# Patient Record
Sex: FEMALE | Race: WHITE | NOT HISPANIC OR LATINO | Employment: UNEMPLOYED | URBAN - METROPOLITAN AREA
[De-identification: names, ages, dates, MRNs, and addresses within clinical notes are randomized per-mention and may not be internally consistent; named-entity substitution may affect disease eponyms.]

---
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
## 2024-08-09 ENCOUNTER — OFFICE VISIT (OUTPATIENT)
Dept: ORTHOPEDIC SURGERY | Facility: CLINIC | Age: 19
End: 2024-08-09
Payer: COMMERCIAL

## 2024-08-09 ENCOUNTER — HOSPITAL ENCOUNTER (OUTPATIENT)
Dept: RADIOLOGY | Facility: CLINIC | Age: 19
Discharge: HOME | End: 2024-08-09
Payer: COMMERCIAL

## 2024-08-09 VITALS — HEIGHT: 62 IN | BODY MASS INDEX: 23.85 KG/M2 | WEIGHT: 129.63 LBS

## 2024-08-09 DIAGNOSIS — M41.9 SCOLIOSIS, UNSPECIFIED SCOLIOSIS TYPE, UNSPECIFIED SPINAL REGION: ICD-10-CM

## 2024-08-09 PROCEDURE — 99214 OFFICE O/P EST MOD 30 MIN: CPT | Performed by: ORTHOPAEDIC SURGERY

## 2024-08-09 PROCEDURE — 72081 X-RAY EXAM ENTIRE SPI 1 VW: CPT

## 2024-08-09 PROCEDURE — 3008F BODY MASS INDEX DOCD: CPT | Performed by: ORTHOPAEDIC SURGERY

## 2024-08-09 ASSESSMENT — PAIN SCALES - GENERAL: PAINLEVEL: 4

## 2024-08-09 NOTE — PROGRESS NOTES
Mariana Jensen is a 19 y.o. female who presents today for follow-up of her adolescent idiopathic scoliosis. She has a positive family history of scoliosis; her younger sister and her father have scoliosis. Her paternal great aunt underwent a posterior spinal fusion. She denies pain, neurologic symptoms, and nocturia. She began her menses about 4 years ago. 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     Past Medical History:   Diagnosis Date    Acute upper respiratory infection, unspecified 04/18/2016    Viral upper respiratory illness    Anxiety disorder, unspecified 06/13/2022    Anxiety    Encounter for immunization 12/11/2014    Need for hepatitis A vaccination    Encounter for screening for lipoid disorders 06/10/2013    Encounter for screening for lipoid disorders    Personal history of other diseases of the digestive system 12/06/2018    History of gastroenteritis    Personal history of other diseases of the respiratory system 12/06/2018    History of acute pharyngitis    Personal history of other diseases of the respiratory system 10/29/2015    History of acute pharyngitis    Personal history of other diseases of the respiratory system 05/21/2015    History of acute sinusitis    Personal history of other infectious and parasitic diseases 05/07/2015    History of tinea corporis    Personal history of other infectious and parasitic diseases     History of respiratory syncytial virus infection    Personal history of other specified conditions 10/16/2012    History of headache    Personal history of other specified conditions 02/18/2014    History of vomiting    Personal history of other specified conditions 07/27/2022    History of fatigue    Unspecified asthma with (acute) exacerbation ([facility]) 10/07/2013    Asthma with acute exacerbation    Unspecified asthma with (acute) exacerbation ([facility]) 12/06/2018    Asthma with acute exacerbation    Unspecified injury of  unspecified wrist, hand and finger(s), initial encounter 12/06/2018    Finger injury       No past surgical history on file.    Current Outpatient Medications on File Prior to Visit   Medication Sig Dispense Refill    albuterol 90 mcg/actuation inhaler TAKE 2 PUFFS BY MOUTH EVERY 4 TO 6 HOURS AS NEEDED 18 g 2    ketoconazole (NIZOral) 2 % shampoo APPLY TOPICALLY TO SCALP IN SHOWER 3-4 TIMES PER WEEK, LET SIT FOR THREE TO FIVE MINUTES THEN RINSE      sertraline (Zoloft) 50 mg tablet TAKE 1 AND 1/2 TABLETS BY MOUTH DAILY 135 tablet 2    Sronyx 0.1-20 mg-mcg tablet TAKE 1 TABLET BY MOUTH EVERY DAY 84 tablet 3    tretinoin (Retin-A) 0.025 % cream APPLY PEA SIZE AMOUNT ON FACE 2-3 NIGHTS PER WEEK, INCREASING TO NIGHTLY AS TOLERATED      triamcinolone (Kenalog) 0.1 % cream APPLY TWICE DAILY TO AFFECTED AREA ON LEGS X14 DAYS, THEN AS NEED FOR FLARES UP TO 2 WEEKS       No current facility-administered medications on file prior to visit.       Allergies   Allergen Reactions    Penicillins Diarrhea and Rash       No family history on file.    Social History     Socioeconomic History    Marital status: Single     Spouse name: Not on file    Number of children: Not on file    Years of education: Not on file    Highest education level: Not on file   Occupational History    Not on file   Tobacco Use    Smoking status: Not on file    Smokeless tobacco: Not on file   Substance and Sexual Activity    Alcohol use: Not on file    Drug use: Not on file    Sexual activity: Not on file   Other Topics Concern    Not on file   Social History Narrative    Not on file     Social Determinants of Health     Financial Resource Strain: Not on file   Food Insecurity: Not on file   Transportation Needs: Not on file   Physical Activity: Not on file   Stress: Not on file   Social Connections: Not on file   Intimate Partner Violence: Not on file   Housing Stability: Not on file       ROS:  A 16 system review is negative in all systems except those  "listed above in the history, as reviewed by me.        Physical Exam:    General :  Well developed, well nourished female in no acute distress.  Skin:  The skin is intact with no evidence of abrasions, bruises, or swelling.  Height:   Ht Readings from Last 1 Encounters:   08/09/24 1.573 m (5' 1.93\")     Weight:   Vitals:    08/09/24 1523   Weight: 58.8 kg (129 lb 10.1 oz)     She stood with level pelvis and shoulders. In the forward bend position, there was a moderate TL rib hump and mild lumbar prominence.  There is good spinal mobility with right and left lateral bending, lateral rotation and lumbar extension. The neurological examination was normal.  Muscle strength was 5/5 in all muscle groups. No sensory deficits were present. Deep tendon reflexes were 2+ and symmetrical with no signs of clonus. Babinski signs were absent.  Her muscles are generally tight in her back and she has good flexibility despite that, but a small waist shift.      XR: X-rays show a 30 degree curve from T3 5 to T11, 45 degree curve from T11-L3 and an 18 degree curve from L3-L5.  She has 13 ribs on each side.  She is Risser 5.    A/P:  19 y.o. female with Idiopathic - Adolescent scoliosis.    Her curve measured 45 degrees today whereas it has measured 40 most recently.  This is not a huge change, but does mean we need to keep watching her.  Will see her again in 1 year with upright AP only scoliosis x-rays.  There conversation about the amount of pain that she has because of her tight muscles.  She is doing a lot to try to manage that but the pain remains somewhat persistent.  We talked about both fusion and nonfusion options to surgically improve her back, prevent further progression, and hopefully improve the pain.  Will discuss this again next year although she would be unlikely to pursue any before she graduates college.      "

## 2024-09-01 DIAGNOSIS — F41.9 ANXIETY: ICD-10-CM

## 2024-09-03 RX ORDER — SERTRALINE HYDROCHLORIDE 50 MG/1
TABLET, FILM COATED ORAL
Qty: 135 TABLET | Refills: 2 | Status: SHIPPED | OUTPATIENT
Start: 2024-09-03

## 2024-09-23 DIAGNOSIS — N94.6 DYSMENORRHEA: ICD-10-CM

## 2024-09-23 RX ORDER — LEVONORGESTREL AND ETHINYL ESTRADIOL 0.1-0.02MG
1 KIT ORAL DAILY
Qty: 84 TABLET | Refills: 3 | Status: SHIPPED | OUTPATIENT
Start: 2024-09-23

## 2024-09-23 NOTE — TELEPHONE ENCOUNTER
Patient called in she missed placed her birth control on her way back to school and she was wondering if you could send in some more.

## 2024-12-14 ENCOUNTER — OFFICE VISIT (OUTPATIENT)
Dept: URGENT CARE | Age: 19
End: 2024-12-14
Payer: COMMERCIAL

## 2024-12-14 VITALS
OXYGEN SATURATION: 98 % | DIASTOLIC BLOOD PRESSURE: 77 MMHG | TEMPERATURE: 98.3 F | SYSTOLIC BLOOD PRESSURE: 113 MMHG | HEART RATE: 100 BPM

## 2024-12-14 DIAGNOSIS — J06.9 VIRAL URI: Primary | ICD-10-CM

## 2024-12-14 DIAGNOSIS — Z20.822 SUSPECTED COVID-19 VIRUS INFECTION: ICD-10-CM

## 2024-12-14 DIAGNOSIS — R09.81 SINUS CONGESTION: ICD-10-CM

## 2024-12-14 LAB
POC RAPID INFLUENZA A: NEGATIVE
POC RAPID INFLUENZA B: NEGATIVE
POC SARS-COV-2 AG BINAX: NORMAL

## 2024-12-14 ASSESSMENT — ENCOUNTER SYMPTOMS
SORE THROAT: 0
CARDIOVASCULAR NEGATIVE: 1
CONSTITUTIONAL NEGATIVE: 1
COUGH: 1

## 2024-12-14 NOTE — PROGRESS NOTES
Subjective   Patient ID: Mariana Jensen is a 19 y.o. female. They present today with a chief complaint of Nasal Congestion (Cough, joint pian, yellow mucous, no taste).    History of Present Illness  19-year-old female presents to clinic today with complaints of nasal congestion.  Patient states she is also had some bodyaches.  She states is been ongoing for the last 2 to 3 days.  She has lost her taste.  Patient does induce a cough.  Patient denies chest pain or shortness of breath.  She is taking 1 night of NyQuil.  Denies sore throat.  Does not do some minor ear pain.          Past Medical History  Allergies as of 12/14/2024 - Reviewed 08/09/2024   Allergen Reaction Noted    Penicillins Diarrhea and Rash 07/25/2011       (Not in a hospital admission)       Past Medical History:   Diagnosis Date    Acute upper respiratory infection, unspecified 04/18/2016    Viral upper respiratory illness    Anxiety disorder, unspecified 06/13/2022    Anxiety    Encounter for immunization 12/11/2014    Need for hepatitis A vaccination    Encounter for screening for lipoid disorders 06/10/2013    Encounter for screening for lipoid disorders    Personal history of other diseases of the digestive system 12/06/2018    History of gastroenteritis    Personal history of other diseases of the respiratory system 12/06/2018    History of acute pharyngitis    Personal history of other diseases of the respiratory system 10/29/2015    History of acute pharyngitis    Personal history of other diseases of the respiratory system 05/21/2015    History of acute sinusitis    Personal history of other infectious and parasitic diseases 05/07/2015    History of tinea corporis    Personal history of other infectious and parasitic diseases     History of respiratory syncytial virus infection    Personal history of other specified conditions 10/16/2012    History of headache    Personal history of other specified conditions 02/18/2014    History of  vomiting    Personal history of other specified conditions 07/27/2022    History of fatigue    Unspecified asthma with (acute) exacerbation ([facility]) 10/07/2013    Asthma with acute exacerbation    Unspecified asthma with (acute) exacerbation ([facility]) 12/06/2018    Asthma with acute exacerbation    Unspecified injury of unspecified wrist, hand and finger(s), initial encounter 12/06/2018    Finger injury       No past surgical history on file.         Review of Systems  Review of Systems   Constitutional: Negative.    HENT:  Positive for congestion and ear pain. Negative for sore throat.    Respiratory:  Positive for cough.    Cardiovascular: Negative.                                   Objective    Vitals:    12/14/24 1221   BP: 113/77   Pulse: 100   Temp: 36.8 °C (98.3 °F)   SpO2: 98%     No LMP recorded.    Physical Exam  Constitutional:       General: She is not in acute distress.     Appearance: Normal appearance.   HENT:      Right Ear: Tympanic membrane and ear canal normal.      Left Ear: Tympanic membrane and ear canal normal.      Mouth/Throat:      Mouth: Mucous membranes are moist.      Pharynx: No oropharyngeal exudate or posterior oropharyngeal erythema.   Cardiovascular:      Rate and Rhythm: Normal rate and regular rhythm.      Heart sounds: Normal heart sounds.   Pulmonary:      Effort: Pulmonary effort is normal.      Breath sounds: Normal breath sounds.   Neurological:      Mental Status: She is alert.         Procedures    Point of Care Test & Imaging Results from this visit  Results for orders placed or performed in visit on 12/14/24   POCT Influenza A/B manually resulted   Result Value Ref Range    POC Rapid Influenza A Negative Negative    POC Rapid Influenza B Negative Negative   POCT Covid-19 Rapid Antigen   Result Value Ref Range    POC BENJY-COV-2 AG  Presumptive negative test for SARS-CoV-2 (no antigen detected)     Presumptive negative test for SARS-CoV-2 (no antigen detected)      No  results found.    Diagnostic study results (if any) were reviewed by Yoni Levy PA-C.    Assessment/Plan   Allergies, medications, history, and pertinent labs/EKGs/Imaging reviewed by Yoni Levy PA-C.     Medical Decision Making  Patient pleasant and cooperative on examination.    Cardiopulmonary examination within normal limits.    No other acute abnormality noted.    Rapid COVID and flu testing negative.    I did discuss with patient that this is most likely viral in nature.  Advised on proper over-the-counter medications to supplement with.    Patient alert and oriented, no acute distress upon discharge.    Orders and Diagnoses  Diagnoses and all orders for this visit:  Viral URI  Suspected COVID-19 virus infection  -     POCT Covid-19 Rapid Antigen  Sinus congestion  -     POCT Influenza A/B manually resulted      Medical Admin Record      Patient disposition: Home    Electronically signed by Yoni Levy PA-C  12:37 PM

## 2024-12-14 NOTE — PATIENT INSTRUCTIONS
Symptoms from viral illnesses generally resolve in 7-10 days. Cough may continue for up to 21 days.      Viral illnesses can not be treated with antibiotics. Antibiotics do not work for viruses.      Consider taking Mucinex for congestion. Make sure to drink plenty of fluids while taking this medication as it increases its effectiveness.     Consider Zyrtec or Claritin    Consider Flonase Nasal Spray    Consider taking Sudafed to help decrease any congestion. If no history of high blood pressure.  Consider Corcedin BP for high blood pressure.    Use throat lozenges, buckwheat honey, gargling salt water to help relieve sore throat symptoms.     Recommend inhaling steam from a hot shower or hot bath as well as using saline sinus rinse for congestion. Recommend Sergio Med sinus rinse. Make sure to use bottled or distilled water.

## 2024-12-30 ENCOUNTER — OFFICE VISIT (OUTPATIENT)
Dept: ORTHOPEDIC SURGERY | Facility: CLINIC | Age: 19
End: 2024-12-30
Payer: COMMERCIAL

## 2024-12-30 VITALS — WEIGHT: 133 LBS | BODY MASS INDEX: 24.48 KG/M2 | HEIGHT: 62 IN

## 2024-12-30 DIAGNOSIS — M41.124 ADOLESCENT IDIOPATHIC SCOLIOSIS OF THORACIC REGION: ICD-10-CM

## 2024-12-30 DIAGNOSIS — M41.129 ADOLESCENT IDIOPATHIC SCOLIOSIS: Primary | ICD-10-CM

## 2024-12-30 DIAGNOSIS — M41.125 ADOLESCENT IDIOPATHIC SCOLIOSIS OF THORACOLUMBAR REGION: ICD-10-CM

## 2024-12-30 PROCEDURE — 99214 OFFICE O/P EST MOD 30 MIN: CPT | Performed by: ORTHOPAEDIC SURGERY

## 2024-12-30 PROCEDURE — 3008F BODY MASS INDEX DOCD: CPT | Performed by: ORTHOPAEDIC SURGERY

## 2024-12-30 ASSESSMENT — PAIN - FUNCTIONAL ASSESSMENT: PAIN_FUNCTIONAL_ASSESSMENT: NO/DENIES PAIN

## 2025-01-02 NOTE — PROGRESS NOTES
Mariana Jensen is a 19 y.o. female who presents today for follow-up of her adolescent idiopathic scoliosis. She has a positive family history of scoliosis; her younger sister and her father have scoliosis. Her paternal great aunt underwent a posterior spinal fusion. She denies pain, neurologic symptoms, and nocturia. She began her menses about 4 years ago. 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     Past Medical History:   Diagnosis Date    Acute upper respiratory infection, unspecified 04/18/2016    Viral upper respiratory illness    Anxiety disorder, unspecified 06/13/2022    Anxiety    Encounter for immunization 12/11/2014    Need for hepatitis A vaccination    Encounter for screening for lipoid disorders 06/10/2013    Encounter for screening for lipoid disorders    Personal history of other diseases of the digestive system 12/06/2018    History of gastroenteritis    Personal history of other diseases of the respiratory system 12/06/2018    History of acute pharyngitis    Personal history of other diseases of the respiratory system 10/29/2015    History of acute pharyngitis    Personal history of other diseases of the respiratory system 05/21/2015    History of acute sinusitis    Personal history of other infectious and parasitic diseases 05/07/2015    History of tinea corporis    Personal history of other infectious and parasitic diseases     History of respiratory syncytial virus infection    Personal history of other specified conditions 10/16/2012    History of headache    Personal history of other specified conditions 02/18/2014    History of vomiting    Personal history of other specified conditions 07/27/2022    History of fatigue    Unspecified asthma with (acute) exacerbation ([facility]) 10/07/2013    Asthma with acute exacerbation    Unspecified asthma with (acute) exacerbation ([facility]) 12/06/2018    Asthma with acute exacerbation    Unspecified injury of  unspecified wrist, hand and finger(s), initial encounter 12/06/2018    Finger injury       No past surgical history on file.    Current Outpatient Medications on File Prior to Visit   Medication Sig Dispense Refill    albuterol 90 mcg/actuation inhaler TAKE 2 PUFFS BY MOUTH EVERY 4 TO 6 HOURS AS NEEDED 18 g 2    ketoconazole (NIZOral) 2 % shampoo APPLY TOPICALLY TO SCALP IN SHOWER 3-4 TIMES PER WEEK, LET SIT FOR THREE TO FIVE MINUTES THEN RINSE      sertraline (Zoloft) 50 mg tablet TAKE 1 AND 1/2 TABLETS DAILY BY MOUTH 135 tablet 2    Sronyx 0.1-20 mg-mcg tablet Take 1 tablet by mouth once daily. 84 tablet 3    tretinoin (Retin-A) 0.025 % cream APPLY PEA SIZE AMOUNT ON FACE 2-3 NIGHTS PER WEEK, INCREASING TO NIGHTLY AS TOLERATED      triamcinolone (Kenalog) 0.1 % cream APPLY TWICE DAILY TO AFFECTED AREA ON LEGS X14 DAYS, THEN AS NEED FOR FLARES UP TO 2 WEEKS       No current facility-administered medications on file prior to visit.       Allergies   Allergen Reactions    Penicillins Diarrhea and Rash       No family history on file.    Social History     Socioeconomic History    Marital status: Single     Spouse name: Not on file    Number of children: Not on file    Years of education: Not on file    Highest education level: Not on file   Occupational History    Not on file   Tobacco Use    Smoking status: Not on file    Smokeless tobacco: Not on file   Substance and Sexual Activity    Alcohol use: Not on file    Drug use: Not on file    Sexual activity: Not on file   Other Topics Concern    Not on file   Social History Narrative    Not on file     Social Drivers of Health     Financial Resource Strain: Not on file   Food Insecurity: Not on file   Transportation Needs: Not on file   Physical Activity: Not on file   Stress: Not on file   Social Connections: Not on file   Intimate Partner Violence: Not on file   Housing Stability: Not on file       ROS:  A 16 system review is negative in all systems except those  "listed above in the history, as reviewed by me.        Physical Exam:    General :  Well developed, well nourished female in no acute distress.  Skin:  The skin is intact with no evidence of abrasions, bruises, or swelling.  Height:   Ht Readings from Last 1 Encounters:   12/30/24 1.575 m (5' 2\")     Weight:   Vitals:    12/30/24 1429   Weight: 60.3 kg (133 lb)     She stood with level pelvis and shoulders. In the forward bend position, there was a moderate TL rib hump and mild lumbar prominence.  There is good spinal mobility with right and left lateral bending, lateral rotation and lumbar extension. The neurological examination was normal.  Muscle strength was 5/5 in all muscle groups. No sensory deficits were present. Deep tendon reflexes were 2+ and symmetrical with no signs of clonus. Babinski signs were absent.  Her muscles are generally tight in her back and she has good flexibility despite that, but a small waist shift.      XR: X-rays show a 30 degree curve from T3 5 to T11, 47 degree curve from T11-L3 and an 18 degree curve from L3-L5.  She has 13 ribs on each side.  She is Risser 5.    A/P:  19 y.o. female with Idiopathic - Adolescent scoliosis.    Her curve measured 47 degrees today.  Her steady progression has continued and she is now uncomfortable enough to elect for proceeding with surgery.    I discussed with Mariana Jensen and family that the curve has progressed considerably.  It has reached surgical magnitude.  This is typically reserved for curves that exceed 45 or 50 degrees.  Since the curve has already reached this size, I have recommended a posterior spinal fusion and segmental spinal instrumentation.    I discussed with the family the indications for surgery, the procedure itself, the postoperative management, and the expected results.  I also discussed possible complications, including postoperative neurologic injury, postoperative infection, bone and metal failure, and late " pseudoarthrosis.  We will plan to instrument the thoracic and lumbar spine from approximately T4 to L4.  Standard instrumentation is Orthopediatrics Response.  Allograft is Musculoskeletal transplant [facility] Cancellous Chips.  If we have ordered a preop CT, it is to allow for computerized navigation in the course of the surgery.     We will be in touch with the family to schedule the surgery.

## 2025-01-31 DIAGNOSIS — M41.9 SCOLIOSIS, UNSPECIFIED SCOLIOSIS TYPE, UNSPECIFIED SPINAL REGION: Primary | ICD-10-CM

## 2025-04-07 DIAGNOSIS — M41.125 ADOLESCENT IDIOPATHIC SCOLIOSIS OF THORACOLUMBAR REGION: Primary | ICD-10-CM

## 2025-04-28 ENCOUNTER — CLINICAL SUPPORT (OUTPATIENT)
Dept: PREADMISSION TESTING | Facility: HOSPITAL | Age: 20
End: 2025-04-28
Payer: COMMERCIAL

## 2025-04-28 NOTE — CPM/PAT H&P
CPM/PAT Evaluation       Name: Mariana Jensen (Mariana Jensen)  /Age: 2005/ y.o.     {[facility] Visit Type:89908}      Chief Complaint: ***    HPI    Medical History[1]    Surgical History[2]    Patient  has no history on file for sexual activity.    Family History[3]    Allergies[4]    Prior to Admission medications    Medication Sig Start Date End Date Taking? Authorizing Provider   albuterol 90 mcg/actuation inhaler TAKE 2 PUFFS BY MOUTH EVERY 4 TO 6 HOURS AS NEEDED 7/3/23   DELPHINE Lugo   ketoconazole (NIZOral) 2 % shampoo APPLY TOPICALLY TO SCALP IN SHOWER 3-4 TIMES PER WEEK, LET SIT FOR THREE TO FIVE MINUTES THEN RINSE 23   Historical Provider, MD   sertraline (Zoloft) 50 mg tablet TAKE 1 AND 1/2 TABLETS DAILY BY MOUTH 9/3/24   DELPHINE Lugo   Sronyx 0.1-20 mg-mcg tablet Take 1 tablet by mouth once daily. 24   DELPHINE Lugo   tretinoin (Retin-A) 0.025 % cream APPLY PEA SIZE AMOUNT ON FACE 2-3 NIGHTS PER WEEK, INCREASING TO NIGHTLY AS TOLERATED 23   Historical Provider, MD   triamcinolone (Kenalog) 0.1 % cream APPLY TWICE DAILY TO AFFECTED AREA ON LEGS X14 DAYS, THEN AS NEED FOR FLARES UP TO 2 WEEKS 3/15/23   Historical Provider, MD PHI YIP Physical Exam     Airway    Testing/Diagnostic:     Patient Specialist/PCP:   Data only, no medication, providers, or history verified. Information updated based solely on chart review.      PCP  7/3/23 - DELPHINE Lugo  Wellness visit    Ortho Surg  24 - Carmen Jones MD    Follow-up adolescent idiopathic scoliosis. Her curve has progressed and reached surgical magnitude, recommended posterior spinal fusion and segmental spinal instrumentation.     Visit Vitals  Smoking Status Never       DASI Risk Score      Flowsheet Row Questionnaire Series Submission from 1/3/2025 in [facility] with Generic Provider Mycindigot   Can you take care of yourself (eat, dress, bathe, or use  toilet)?  2.75  filed at 01/03/2025 2051   Can you walk indoors, such as around your house? 1.75  filed at 01/03/2025 2051   Can you walk a block or two on level ground?  2.75  filed at 01/03/2025 2051   Can you climb a flight of stairs or walk up a hill? 5.5  filed at 01/03/2025 2051   Can you run a short distance? 8  filed at 01/03/2025 2051   Can you do light work around the house like dusting or washing dishes? 2.7  filed at 01/03/2025 2051   Can you do moderate work around the house like vacuuming, sweeping floors or carrying groceries? 3.5  filed at 01/03/2025 2051   Can you do heavy work around the house like scrubbing floors or lifting and moving heavy furniture?  8  filed at 01/03/2025 2051   Can you do yard work like raking leaves, weeding or pushing a mower? 4.5  filed at 01/03/2025 2051   Can you have sexual relations? 5.25  filed at 01/03/2025 2051   Can you participate in moderate recreational activities like golf, bowling, dancing, doubles tennis or throwing a baseball or football? 6  filed at 01/03/2025 2051   Can you participate in strenous sports like swimming, singles tennis, football, basketball, or skiing? 7.5  filed at 01/03/2025 2051   DASI SCORE 58.2  filed at 01/03/2025 2051   METS Score (Will be calculated only when all the questions are answered) 9.9  filed at 01/03/2025 2051          Caprini DVT Assessment    No data to display       Modified Frailty Index    No data to display       [id number] Stroke Risk Points  Current as of just now        N/A 0 to 9 Points:      Last Change: N/A          The [id number] risk score (Lip SETH, et al. 2009. © 2010 American College [employer]) quantifies the risk of stroke for a patient with atrial fibrillation. For patients without atrial fibrillation or under the age of 18 this score appears as N/A. Higher score values generally indicate higher risk of stroke.        This score is not applicable to this patient. Components are not calculated.           Revised Cardiac Risk Index    No data to display       Apfel Simplified Score    No data to display       Risk Analysis Index Results This Encounter    No data found in the last 10 encounters.       Stop Bang Score      Flowsheet Row Questionnaire Series Submission from 1/3/2025 in [facility] with Generic Provider Marley   Do you snore loudly? 0  filed at 01/03/2025 2051   Do you often feel tired or fatigued after your sleep? 0  filed at 01/03/2025 2051   Has anyone ever observed you stop breathing in your sleep? 0  filed at 01/03/2025 2051   Do you have or are you being treated for high blood pressure? 0  filed at 01/03/2025 2051   Recent BMI (Calculated) 24.3  filed at 01/03/2025 2051   Is BMI greater than 35 kg/m2? 0=No  filed at 01/03/2025 2051   Age older than 50 years old? 0=No  filed at 01/03/2025 2051   Gender - Male 0=No  filed at 01/03/2025 2051          Prodigy: High Risk  Total Score: 0          ARISCAT Score for Postoperative Pulmonary Complications    No data to display       Crawley Perioperative Risk for Myocardial Infarction or Cardiac Arrest (SIM)    No data to display         Assessment and Plan:     {[facility] ASSESSMENT AND PLAN:31385}             [1]   Past Medical History:  Diagnosis Date    Anemia     Ankle sprain     Anxiety disorder, unspecified 06/13/2022    Anxiety    Asthma     Fracture of hand     Fracture of wrist     left    Migraine     Scoliosis     Spider angioma     cheeks and hand    Unspecified asthma with (acute) exacerbation ([facility]) 10/07/2013    Asthma with acute exacerbation    Unspecified asthma with (acute) exacerbation ([facility]) 12/06/2018    Asthma with acute exacerbation    Unspecified injury of unspecified wrist, hand and finger(s), initial encounter 12/06/2018    Finger injury   [2]   Past Surgical History:  Procedure Laterality Date    FRACTURE SURGERY      left wrist    LASER ABLATION OF VASCULAR LESION  01/08/2016    laser of spider angioma on  the cheeks, hands    WRIST SURGERY  03/13/2019    Excision of L wrist dorsal osteophyte   [3]   Family History  Problem Relation Name Age of Onset    Asthma Mother      Allergy (severe) Mother      Migraines Mother      Asthma Father      Migraines Father      Hypertension Father      Allergy (severe) Father      Scoliosis Father      Scoliosis Sister      Hyperlipidemia Maternal Grandmother      Hypertension Maternal Grandmother      Cancer Maternal Grandmother      Other (acute myocardial infarction) Paternal Grandmother      Migraines Paternal Grandfather      Hyperlipidemia Paternal Grandfather      Diabetes Mother's Brother      Rheumatologic disease Father's Brother     [4]   Allergies  Allergen Reactions    Penicillins Diarrhea and Rash

## 2025-05-05 ENCOUNTER — PRE-ADMISSION TESTING (OUTPATIENT)
Dept: PREADMISSION TESTING | Facility: HOSPITAL | Age: 20
End: 2025-05-05
Payer: COMMERCIAL

## 2025-05-05 VITALS
HEART RATE: 82 BPM | TEMPERATURE: 97.6 F | DIASTOLIC BLOOD PRESSURE: 73 MMHG | OXYGEN SATURATION: 96 % | SYSTOLIC BLOOD PRESSURE: 108 MMHG | BODY MASS INDEX: 24.11 KG/M2 | HEIGHT: 62 IN | WEIGHT: 131 LBS

## 2025-05-05 DIAGNOSIS — M41.9 SCOLIOSIS, UNSPECIFIED SCOLIOSIS TYPE, UNSPECIFIED SPINAL REGION: ICD-10-CM

## 2025-05-05 LAB
ABO GROUP (TYPE) IN BLOOD: NORMAL
ANION GAP SERPL CALC-SCNC: 14 MMOL/L (ref 10–20)
ANTIBODY SCREEN: NORMAL
APTT PPP: 30 SECONDS (ref 26–36)
BUN SERPL-MCNC: 10 MG/DL (ref 6–23)
CALCIUM SERPL-MCNC: 9.6 MG/DL (ref 8.6–10.6)
CHLORIDE SERPL-SCNC: 99 MMOL/L (ref 98–107)
CO2 SERPL-SCNC: 30 MMOL/L (ref 21–32)
CREAT SERPL-MCNC: 0.79 MG/DL (ref 0.5–1.05)
EGFRCR SERPLBLD CKD-EPI 2021: >90 ML/MIN/1.73M*2
ERYTHROCYTE [DISTWIDTH] IN BLOOD BY AUTOMATED COUNT: 12.3 % (ref 11.5–14.5)
GLUCOSE SERPL-MCNC: 65 MG/DL (ref 74–99)
HCT VFR BLD AUTO: 48.5 % (ref 36–46)
HGB BLD-MCNC: 16.1 G/DL (ref 12–16)
INR PPP: 1 (ref 0.9–1.1)
MCH RBC QN AUTO: 30.1 PG (ref 26–34)
MCHC RBC AUTO-ENTMCNC: 33.2 G/DL (ref 32–36)
MCV RBC AUTO: 91 FL (ref 80–100)
NRBC BLD-RTO: 0 /100 WBCS (ref 0–0)
PLATELET # BLD AUTO: 300 X10*3/UL (ref 150–450)
POTASSIUM SERPL-SCNC: 4.5 MMOL/L (ref 3.5–5.3)
PROTHROMBIN TIME: 10.5 SECONDS (ref 9.8–12.4)
RBC # BLD AUTO: 5.34 X10*6/UL (ref 4–5.2)
RH FACTOR (ANTIGEN D): NORMAL
SODIUM SERPL-SCNC: 138 MMOL/L (ref 136–145)
WBC # BLD AUTO: 9.7 X10*3/UL (ref 4.4–11.3)

## 2025-05-05 PROCEDURE — 99204 OFFICE O/P NEW MOD 45 MIN: CPT | Performed by: NURSE PRACTITIONER

## 2025-05-05 PROCEDURE — 36415 COLL VENOUS BLD VENIPUNCTURE: CPT

## 2025-05-05 PROCEDURE — 85730 THROMBOPLASTIN TIME PARTIAL: CPT | Performed by: ORTHOPAEDIC SURGERY

## 2025-05-05 PROCEDURE — 85027 COMPLETE CBC AUTOMATED: CPT | Performed by: ORTHOPAEDIC SURGERY

## 2025-05-05 PROCEDURE — 87081 CULTURE SCREEN ONLY: CPT

## 2025-05-05 PROCEDURE — 80048 BASIC METABOLIC PNL TOTAL CA: CPT

## 2025-05-05 PROCEDURE — 86850 RBC ANTIBODY SCREEN: CPT

## 2025-05-05 RX ORDER — CHLORHEXIDINE GLUCONATE 40 MG/ML
SOLUTION TOPICAL
Qty: 473 ML | Refills: 0 | Status: SHIPPED | OUTPATIENT
Start: 2025-05-05

## 2025-05-05 RX ORDER — CHLORHEXIDINE GLUCONATE ORAL RINSE 1.2 MG/ML
SOLUTION DENTAL
Qty: 15 ML | Refills: 0 | Status: SHIPPED | OUTPATIENT
Start: 2025-05-05

## 2025-05-05 ASSESSMENT — DUKE ACTIVITY SCORE INDEX (DASI)
CAN YOU WALK INDOORS, SUCH AS AROUND YOUR HOUSE: YES
CAN YOU TAKE CARE OF YOURSELF (EAT, DRESS, BATHE, OR USE TOILET): YES
CAN YOU WALK A BLOCK OR TWO ON LEVEL GROUND: YES
CAN YOU DO YARD WORK LIKE RAKING LEAVES, WEEDING OR PUSHING A MOWER: YES
CAN YOU PARTICIPATE IN STRENOUS SPORTS LIKE SWIMMING, SINGLES TENNIS, FOOTBALL, BASKETBALL, OR SKIING: YES
CAN YOU PARTICIPATE IN MODERATE RECREATIONAL ACTIVITIES LIKE GOLF, BOWLING, DANCING, DOUBLES TENNIS OR THROWING A BASEBALL OR FOOTBALL: YES
DASI METS SCORE: 9.9
CAN YOU DO LIGHT WORK AROUND THE HOUSE LIKE DUSTING OR WASHING DISHES: YES
TOTAL_SCORE: 58.2
CAN YOU DO HEAVY WORK AROUND THE HOUSE LIKE SCRUBBING FLOORS OR LIFTING AND MOVING HEAVY FURNITURE: YES
CAN YOU DO MODERATE WORK AROUND THE HOUSE LIKE VACUUMING, SWEEPING FLOORS OR CARRYING GROCERIES: YES
CAN YOU HAVE SEXUAL RELATIONS: YES
CAN YOU RUN A SHORT DISTANCE: YES
CAN YOU CLIMB A FLIGHT OF STAIRS OR WALK UP A HILL: YES

## 2025-05-05 ASSESSMENT — ENCOUNTER SYMPTOMS
ENDOCRINE NEGATIVE: 1
ARTHRALGIAS: 1
NECK NEGATIVE: 1
NEUROLOGICAL NEGATIVE: 1
GASTROINTESTINAL NEGATIVE: 1
CARDIOVASCULAR NEGATIVE: 1
RESPIRATORY NEGATIVE: 1
EYES NEGATIVE: 1
CONSTITUTIONAL NEGATIVE: 1
MYALGIAS: 1

## 2025-05-05 ASSESSMENT — LIFESTYLE VARIABLES: SMOKING_STATUS: NONSMOKER

## 2025-05-05 NOTE — CPM/PAT H&P
CPM/PAT Evaluation       Name: Mariana Jensen (Mariana Jensen)  /Age:  y.o.     Visit Type:   In-Person       Chief Complaint: perioperative evaluation      HPI  The patient is a 19 year old female with adolescent idiopathic scoliosis. She denies pain, neurologic symptoms, and nocturia. She is referred today by Dr. Carmen Jones for perioperative evaluation in anticipation of Posterior spinal fusion with instrumentation and bone graft on 25.    Medical History[1]    Surgical History[2]    Patient Sexual activity questions deferred to the physician.    Family History[3]    Allergies[4]    Prior to Admission medications    Medication Sig Start Date End Date Taking? Authorizing Provider   albuterol 90 mcg/actuation inhaler TAKE 2 PUFFS BY MOUTH EVERY 4 TO 6 HOURS AS NEEDED 7/3/23   DELPHINE Lugo   ketoconazole (NIZOral) 2 % shampoo APPLY TOPICALLY TO SCALP IN SHOWER 3-4 TIMES PER WEEK, LET SIT FOR THREE TO FIVE MINUTES THEN RINSE 23   Historical Provider, MD   sertraline (Zoloft) 50 mg tablet TAKE 1 AND 1/2 TABLETS DAILY BY MOUTH 9/3/24   DELPHINE Lugo   Sronyx 0.1-20 mg-mcg tablet Take 1 tablet by mouth once daily. 24   DELPHINE Lugo   tretinoin (Retin-A) 0.025 % cream APPLY PEA SIZE AMOUNT ON FACE 2-3 NIGHTS PER WEEK, INCREASING TO NIGHTLY AS TOLERATED 23   Historical Provider, MD   triamcinolone (Kenalog) 0.1 % cream APPLY TWICE DAILY TO AFFECTED AREA ON LEGS X14 DAYS, THEN AS NEED FOR FLARES UP TO 2 WEEKS 3/15/23   Historical Provider, MD YIP ROS:   Constitutional:   neg    Neuro/Psych:    B/l lower extremity neuropathy  neg    Eyes:   neg    Ears:   neg    Nose:   neg    Mouth:   neg    Throat:   neg    Neck:   neg    Cardio:   neg    Respiratory:   neg    Endocrine:   neg    GI:   neg    :   neg    Musculoskeletal:    arthralgias   myalgias  Hematologic:   neg    Skin:  neg        Physical Exam  Vitals reviewed.  "  Constitutional:       Appearance: Normal appearance.   HENT:      Head: Normocephalic and atraumatic.      Nose: Nose normal.      Mouth/Throat:      Mouth: Mucous membranes are moist.      Pharynx: Oropharynx is clear.   Eyes:      Extraocular Movements: Extraocular movements intact.      Conjunctiva/sclera: Conjunctivae normal.      Pupils: Pupils are equal, round, and reactive to light.   Cardiovascular:      Rate and Rhythm: Normal rate and regular rhythm.      Pulses: Normal pulses.      Heart sounds: Normal heart sounds.   Pulmonary:      Effort: Pulmonary effort is normal.      Breath sounds: Normal breath sounds.   Musculoskeletal:         General: Normal range of motion.      Cervical back: Normal range of motion.   Skin:     General: Skin is warm and dry.   Neurological:      General: No focal deficit present.      Mental Status: She is alert and oriented to person, place, and time.   Psychiatric:         Mood and Affect: Mood normal.         Behavior: Behavior normal.          PAT AIRWAY:   Airway:     Mallampati::  I    TM distance::  >3 FB    Neck ROM::  Full  normal        Visit Vitals  /73   Pulse 82   Temp 36.4 °C (97.6 °F)   Ht 1.575 m (5' 2\")   Wt 59.4 kg (131 lb)   SpO2 96%   BMI 23.96 kg/m²   Smoking Status Never   BSA 1.61 m²       DASI Risk Score      Flowsheet Row Pre-Admission Testing from 5/5/2025 in [facility] Questionnaire Series Submission from 1/3/2025 in [facility] with Generic Provider Marley   Can you take care of yourself (eat, dress, bathe, or use toilet)?  2.75 filed at 05/05/2025 0922 2.75  filed at 01/03/2025 2051   Can you walk indoors, such as around your house? 1.75 filed at 05/05/2025 0922 1.75  filed at 01/03/2025 2051   Can you walk a block or two on level ground?  2.75 filed at 05/05/2025 0922 2.75  filed at 01/03/2025 2051   Can you climb a flight of stairs or walk up a hill? 5.5 filed at 05/05/2025 0922 5.5  filed at 01/03/2025 2051   Can you " run a short distance? 8 filed at 05/05/2025 0922 8  filed at 01/03/2025 2051   Can you do light work around the house like dusting or washing dishes? 2.7 filed at 05/05/2025 0922 2.7  filed at 01/03/2025 2051   Can you do moderate work around the house like vacuuming, sweeping floors or carrying groceries? 3.5 filed at 05/05/2025 0922 3.5  filed at 01/03/2025 2051   Can you do heavy work around the house like scrubbing floors or lifting and moving heavy furniture?  8 filed at 05/05/2025 0922 8  filed at 01/03/2025 2051   Can you do yard work like raking leaves, weeding or pushing a mower? 4.5 filed at 05/05/2025 0922 4.5  filed at 01/03/2025 2051   Can you have sexual relations? 5.25 filed at 05/05/2025 0922 5.25  filed at 01/03/2025 2051   Can you participate in moderate recreational activities like golf, bowling, dancing, doubles tennis or throwing a baseball or football? 6 filed at 05/05/2025 0922 6  filed at 01/03/2025 2051   Can you participate in strenous sports like swimming, singles tennis, football, basketball, or skiing? 7.5 filed at 05/05/2025 0922 7.5  filed at 01/03/2025 2051   DASI SCORE 58.2 filed at 05/05/2025 0922 58.2  filed at 01/03/2025 2051   METS Score (Will be calculated only when all the questions are answered) 9.9 filed at 05/05/2025 0922 9.9  filed at 01/03/2025 2051          Caprini DVT Assessment      Flowsheet Row Pre-Admission Testing from 5/5/2025 in [facility]   DVT Score (IF A SCORE IS NOT CALCULATING, MUST SELECT A BMI TO COMPLETE) 7 filed at 05/05/2025 0940   Surgical Factors Major surgery planned, lasting over 3 hours filed at 05/05/2025 0940   Women Oral contraceptives filed at 05/05/2025 0940   BMI (BMI MUST BE CHOSEN) 30 or less filed at 05/05/2025 0940          Modified Frailty Index    No data to display       [id number] Stroke Risk Points  Current as of just now        N/A 0 to 9 Points:      Last Change: N/A          The [id number] risk score (Lip GH,  et al. 2009. © 2010 American College of Chest [employer]icians) quantifies the risk of stroke for a patient with atrial fibrillation. For patients without atrial fibrillation or under the age of 18 this score appears as N/A. Higher score values generally indicate higher risk of stroke.        This score is not applicable to this patient. Components are not calculated.          Revised Cardiac Risk Index      Flowsheet Row Pre-Admission Testing from 5/5/2025 in [facility]   High-Risk Surgery (Intraperitoneal, Intrathoracic,Suprainguinal vascular) 0 filed at 05/05/2025 0941   History of ischemic heart disease (History of MI, History of positive exercuse test, Current chest paint considered due to myocardial ischemia, Use of nitrate therapy, ECG with pathological Q Waves) 0 filed at 05/05/2025 0941   History of congestive heart failure (pulmonary edemia, bilateral rales or S3 gallop, Paroxysmal nocturnal dyspnea, CXR showing pulmonary vascular redistribution) 0 filed at 05/05/2025 0941   History of cerebrovascular disease (Prior TIA or stroke) 0 filed at 05/05/2025 0941   Pre-operative insulin treatment 0 filed at 05/05/2025 0941   Pre-operative creatinine>2 mg/dl 0 filed at 05/05/2025 0941   Revised Cardiac Risk Calculator 0 filed at 05/05/2025 0941          Apfel Simplified Score      Flowsheet Row Pre-Admission Testing from 5/5/2025 in [facility]   Smoking status 1 filed at 05/05/2025 0941   History of motion sickness or PONV  0 filed at 05/05/2025 0941   Use of postoperative opioids 1 filed at 05/05/2025 0941   Gender - Female 1=Yes filed at 05/05/2025 0941   Apfel Simplified Score Calculator 3 filed at 05/05/2025 0941          Risk Analysis Index Results This Encounter    No data found in the last 10 encounters.       Stop Bang Score      Flowsheet Row Pre-Admission Testing from 5/5/2025 in [facility] Questionnaire Series Submission from 1/3/2025 in [facility] with  Generic Provider Mychart   Do you snore loudly? 0 filed at 05/05/2025 0923 0  filed at 01/03/2025 2051   Do you often feel tired or fatigued after your sleep? 1 filed at 05/05/2025 0923 0  filed at 01/03/2025 2051   Has anyone ever observed you stop breathing in your sleep? 0 filed at 05/05/2025 0923 0  filed at 01/03/2025 2051   Do you have or are you being treated for high blood pressure? 0 filed at 05/05/2025 0923 0  filed at 01/03/2025 2051   Recent BMI (Calculated) 24 filed at 05/05/2025 0923 24.3  filed at 01/03/2025 2051   Is BMI greater than 35 kg/m2? 0=No filed at 05/05/2025 0923 0=No  filed at 01/03/2025 2051   Age older than 50 years old? 0=No filed at 05/05/2025 0923 0=No  filed at 01/03/2025 2051   Is your neck circumference greater than 17 inches (Male) or 16 inches (Female)? 0 filed at 05/05/2025 0923 --   Gender - Male 0=No filed at 05/05/2025 0923 0=No  filed at 01/03/2025 2051   STOP-BANG Total Score 1 filed at 05/05/2025 0923 --          Prodigy: High Risk  Total Score: 0          ARISCAT Score for Postoperative Pulmonary Complications    No data to display       Crawley Perioperative Risk for Myocardial Infarction or Cardiac Arrest (SIM)    No data to display       Recent Results (from the past 4 weeks)   CBC    Collection Time: 05/05/25  9:40 AM   Result Value Ref Range    WBC 9.7 4.4 - 11.3 x10*3/uL    nRBC 0.0 0.0 - 0.0 /100 WBCs    RBC 5.34 (H) 4.00 - 5.20 x10*6/uL    Hemoglobin 16.1 (H) 12.0 - 16.0 g/dL    Hematocrit 48.5 (H) 36.0 - 46.0 %    MCV 91 80 - 100 fL    MCH 30.1 26.0 - 34.0 pg    MCHC 33.2 32.0 - 36.0 g/dL    RDW 12.3 11.5 - 14.5 %    Platelets 300 150 - 450 x10*3/uL   Coagulation Screen    Collection Time: 05/05/25  9:40 AM   Result Value Ref Range    Protime 10.5 9.8 - 12.4 seconds    INR 1.0 0.9 - 1.1    aPTT 30 26 - 36 seconds   Basic Metabolic Panel    Collection Time: 05/05/25  9:40 AM   Result Value Ref Range    Glucose 65 (L) 74 - 99 mg/dL    Sodium 138 136 - 145 mmol/L     Potassium 4.5 3.5 - 5.3 mmol/L    Chloride 99 98 - 107 mmol/L    Bicarbonate 30 21 - 32 mmol/L    Anion Gap 14 10 - 20 mmol/L    Urea Nitrogen 10 6 - 23 mg/dL    Creatinine 0.79 0.50 - 1.05 mg/dL    eGFR >90 >60 mL/min/1.73m*2    Calcium 9.6 8.6 - 10.6 mg/dL   Type And Screen Is this order related to pregnancy or an upcoming surgery? Yes; Where will this surgery/delivery be performed? [facility]; What is the date of the surgery? 5/20/2025; Has this patient ever had a transfusion? No; Has t...    Collection Time: 05/05/25  9:40 AM   Result Value Ref Range    ABO TYPE O     Rh TYPE POS     ANTIBODY SCREEN NEG    MRSA pending    Diagnostic Results      Assessment and Plan:     Anesthesia:  The patient denies problems with anesthesia in the past such as PONV, prolonged sedation, awareness, dental damage, aspiration, cardiac arrest, difficult intubation, or unexpected hospital admissions.     Neuro:   The patient has history of migraines, anxiety managed on medication. No acute neurological complaints. The patient is at increased risk for perioperative stroke secondary to female gender, general anesthesia, operative time >2.5 hours. Handouts for preoperative brain exercises given to patient.    HEENT/Airway  No documented or reported history of airway difficulty.     Cardiovascular  The patient is scheduled for non-cardiac surgery associated with elevated risk. The patient has no major cardiac contraindications to non- cardiac surgery.  RCRI  The patient meets 0 RCRI criteria and therefor has a 3.9% risk of major adverse cardiac complications.  METS  The patient's functional capacity is greater than 4 METS.  EKG  The patient has no EKG or echocardiographic changes concerning for myocardial ischemia.   Heart Failure  The patient has no known history of heart failure.  Additionally, the patient reports no symptoms of heart failure and demonstrates no signs of heart failure.  Hypertension Evaluation  The  patient has no known history of hypertension and has a normal blood pressure today.  Heart Rhythm Evaluation  The patient has no history of arrhythmias.  Heart Valve Evaluation  The patient has no known history of valvular heart disease. The patient has no symptoms or physical exam findings to suggest valvular heart disease.  Cardiology Evaluation  The patient is not followed by cardiology.    SIM score which indicates a 0% risk of intraoperative or 30-day postoperative MACE (major adverse cardiac event).    Pulmonary   The patient has history of asthma, uses albuterol inhaler as needed. Denies any recent URIs, exacerbations or hospitalizations     The patient has a stop bang score of 1, which places patient at low risk for having KOTA.    ARISCAT 23, low, 1.6% risk of in-hospital postoperative pulmonary complications  PRODIGY 0, low risk of respiratory depression episode. Patient given PI sheet for preoperative deep breathing exercises.    Hematology  No diagnoses or significant findings on chart review or clinical presentation and evaluation.    Caprini score 7, high risk of perioperative VTE.     Patient instructed to ambulate as soon as possible postoperatively to decrease thromboembolic risk. Initiate mechanical DVT prophylaxis as soon as possible and initiate chemical prophylaxis when deemed safe from a bleeding standpoint post surgery.     Transfusion Evaluation  A type and screen was obtained given the likelihood for perioperative transfusion of blood or blood products.    Gastrointestinal  No diagnoses or significant findings on chart review or clinical presentation and evaluation.    Eat 10- 0,  self-perceived oropharyngeal dysphagia scale (0-40)     Genitourinary  No diagnoses or significant findings on chart review or clinical presentation and evaluation.    Renal  No renal diagnoses or significant findings on chart review or clinical presentation and evaluation.    Musculoskeletal  The patient has  idiopathic scoliosis, low back pain and radiculopathy scheduled for surgery with Dr. Jones on 5/20/25.    Endocrine  No diagnoses or significant findings on chart review or clinical presentation and evaluation.    ID  No diagnoses or significant findings on chart review or clinical presentation and evaluation. MRSA screening obtained. Prescriptions and instructions given for Hibiclens and Peridex.    -Preoperative medication instructions were provided and reviewed with the patient.  Any additional testing or evaluation was explained to the patient.  NPO Instructions were discussed, and the patient's questions were answered prior to conclusion of this encounter. Patient verbalized understanding of preoperative instructions. After Visit Summary given.               [1]   Past Medical History:  Diagnosis Date    Anemia     Ankle sprain     Anxiety     Anxiety disorder, unspecified 06/13/2022    Anxiety    Asthma     Fracture of hand     Fracture of wrist     left    Migraine     Scoliosis     Spider angioma     cheeks and hand    Unspecified asthma with (acute) exacerbation ([facility]) 10/07/2013    Asthma with acute exacerbation    Unspecified asthma with (acute) exacerbation ([facility]) 12/06/2018    Asthma with acute exacerbation    Unspecified injury of unspecified wrist, hand and finger(s), initial encounter 12/06/2018    Finger injury    Vision loss     wears corrective lenses   [2]   Past Surgical History:  Procedure Laterality Date    FRACTURE SURGERY      left wrist    LASER ABLATION OF VASCULAR LESION  01/08/2016    laser of spider angioma on the cheeks, hands    WRIST SURGERY  03/13/2019    Excision of L wrist dorsal osteophyte   [3]   Family History  Problem Relation Name Age of Onset    Asthma Mother      Allergy (severe) Mother      Migraines Mother      Asthma Father      Migraines Father      Hypertension Father      Allergy (severe) Father      Scoliosis Father      Scoliosis Sister      Hyperlipidemia  Maternal Grandmother      Hypertension Maternal Grandmother      Cancer Maternal Grandmother      Other (acute myocardial infarction) Paternal Grandmother      Migraines Paternal Grandfather      Hyperlipidemia Paternal Grandfather      Diabetes Mother's Brother      Rheumatologic disease Father's Brother     [4]   Allergies  Allergen Reactions    Penicillins Diarrhea and Rash

## 2025-05-05 NOTE — PREPROCEDURE INSTRUCTIONS
Fasting Guidelines    NPO Instructions:    Do not eat any food after midnight the night before your surgery/procedure.  You may have up to 13.5 ounces of clear liquids until TWO hours before your instructed arrival time to the hospital. This includes water, black tea/coffee, (no milk or cream), apple juice, and/or electrolyte drinks (Gatorade).  You may chew gum up to TWO hours before your surgery/procedure.    Additional Instructions:     We have sent a prescription for Hibiclens soap and Peridex mouth wash to your preferred pharmacy.  If you have not already, Please  your prescription and start using as directed before surgery.  Follow the instruction sheet provided to you at your CPM/PAT appointment.    Avoid herbal supplements, multivitamins and NSAIDS (non-steroidal anti-inflammatory drugs) such as Advil, Aleve, Ibuprofen, Naproxen, Excedrin, Meloxicam or Celebrex for at least 7 days prior to surgery. May take Tylenol as needed.    Avoid tobacco and alcohol products for 24 hours prior to surgery.    CONTACT SURGEON'S OFFICE IF YOU DEVELOP:  * Fever = 100.4 F   * New respiratory symptoms (e.g. cough, shortness of breath, respiratory distress, sore throat)  * Recent loss of taste or smell  *Flu like symptoms such as headache, fatigue or gastrointestinal symptoms  * You develop any open sores, shingles, burning or painful urination   AND/OR:  * You no longer wish to have the surgery.  * Any other personal circumstances change that may lead to the need to cancel or defer this surgery.  *You were admitted to any hospital within one week of your planned procedure.    Seven/Six Days before Surgery:  Review your medication instructions, stop indicated medications    Day of Surgery:  Review your medication instructions, take indicated medications  Wear comfortable loose fitting clothing  Do not use moisturizers, creams, lotions or perfume  All jewelry and valuables should be left at home    Alice Wyatt  [facility]  [phone]  [phone]  Email-[email or web address]      Patient Information: Pre-Operative Infection Prevention Measures     Why did I have my nose, under my arms, and groin swabbed?  The purpose of the swab is to identify Staphylococcus aureus inside your nose or on your skin.  The swab was sent to the laboratory for culture.  A positive swab/culture for Staphylococcus aureus is called colonization or carriage.      What is Staphylococcus aureus?  Staphylococcus aureus, also known as “staph”, is a germ found on the skin or in the nose of healthy people.  Sometimes Staphylococcus aureus can get into the body and cause an infection.  This can be minor (such as pimples, boils, or other skin problems).  It might also be serious (such as a blood infection, pneumonia, or a surgical site infection).    What is Staphylococcus aureus colonization or carriage?  Colonization or carriage means that a person has the germ but is not sick from it.  These bacteria can be spread on the hands or when breathing or sneezing.    How is Staphylococcus aureus spread?  It is most often spread by close contact with a person or item that carries it.    What happens if my culture is positive for Staphylococcus aureus?  Your doctor/medical team will use this information to guide any antibiotic treatment which may be necessary.  Regardless of the culture results, we will clean the inside of your nose with a betadine swab just before you have your surgery.      Will I get an infection if I have Staphylococcus aureus in my nose or on my skin?  Anyone can get an infection with Staphylococcus aureus.  However, the best way to reduce your risk of infection is to follow the instructions provided to you for the use of your CHG soap and dental rinse.        Patient Information: Oral/Dental Rinse    What is oral/dental rinse?   It is a mouthwash. It is a way of cleaning the mouth with a  germ-killing solution before your surgery.  The solution contains chlorhexidine, commonly known as CHG.   It is used inside the mouth to kill a bacteria known as Staphylococcus aureus.  Let your doctor know if you are allergic to Chlorhexidine.    Why do I need to use CHG oral/dental rinse?  The CHG oral/dental rinse helps to kill a bacteria in your mouth known as Staphylococcus aureus.     This reduces the risk of infection at the surgical site.      Using your CHG oral/dental rinse  STEPS:  Use your CHG oral/dental rinse after you brush your teeth the night before (at bedtime) and the morning of your surgery.  Follow all directions on your prescription label.    Use the cap on the container to measure 15ml   Swish (gargle if you can) the mouthwash in your mouth for at least 30 seconds, (do not swallow) and spit out  After you use your CHG rinse, do not rinse your mouth with water, drink or eat.  Please refer to the prescription label for the appropriate time to resume oral intake      What side effects might I have using the CHG oral/dental rinse?  CHG rinse will stick to plaque on the teeth.  Brush and floss just before use.  Teeth brushing will help avoid staining of plaque during use.      Patient Information: Home Preoperative Antibacterial Shower      What is a home preoperative antibacterial shower?  This shower is a way of cleaning the skin with a germ-killing solution before surgery.  The solution contains chlorhexidine, commonly known as CHG.  CHG is a skin cleanser with germ-killing ability.  Let your doctor know if you are allergic to chlorhexidine.    Why do I need to take a preoperative antibacterial shower?  Skin is not sterile.  It is best to try to make your skin as free of germs as possible before surgery.  Proper cleansing with a germ-killing soap before surgery can lower the number of germs on your skin.  This helps to reduce the risk of infection at the surgical site.  Following the  instructions listed below will help you prepare your skin for surgery.      How do I use the solution?  Steps:  Begin using your CHG soap 5 days before your scheduled surgery on ________________________.    First, wash and rinse your hair using the CHG soap. Keep CHG soap away from ear canals and eyes.  Rinse completely, do not condition.  Hair extensions should be removed.  Wash your face with your normal soap and rinse.    Apply the CHG solution to a clean wet washcloth.  Turn the water off or move away from the water spray to avoid premature rinsing of the CHG soap as you are applying.   Firmly lather your entire body from the neck down.  Do not use on your face.  Pay special attention to the area(s) where your incision(s) will be located unless they are on your face.  Avoid scrubbing your skin too hard.  The important point is to have the CHG soap sit on your skin for 3 minutes.    When the 3 minutes are up, turn on the water and rinse the CHG solution off your body completely.   DO NOT wash with regular soap after you have used the CHG soap solution  Pat yourself dry with a clean, freshly-laundered towel.  DO NOT apply powders, deodorants, or lotions.  Dress in clean, freshly laundered nightclothes.    Be sure to sleep with clean, freshly laundered sheets.  Be aware that CHG will cause stains on fabrics; if you wash them with bleach after use.  Rinse your washcloth and other linens that have contact with CHG completely.  Use only non-chlorine detergents to launder the items used.   The morning of surgery is the fifth day.  Repeat the above steps and dress in clean comfortable clothing     Whom should I contact if I have any questions regarding the use of CHG soap?  Call the [facility] at [phone] if you have any questions.               Preoperative Brain Exercises    What are brain exercises?  A brain exercise is any activity that  engages your thinking (cognitive) skills.    What types of activities are considered brain exercises?  Jigsaw puzzles, crossword puzzles, word jumble, memory games, word search, and many more.  Many can be found free online or on your phone via a mobile aysha.    Why should I do brain exercises before my surgery?  More recent research has shown brain exercise before surgery can lower the risk of postoperative delirium (confusion) which can be especially important for older adults.  Patients who did brain exercises for 5 to 10 hours the days before surgery, cut their risk of postoperative delirium in half up to 1 week after surgery.         [facility]    Preoperative Deep Breathing Exercises    Why it is important to do deep breathing exercises before my surgery?  Deep breathing exercises strengthen your breathing muscles.  This helps you to recover after your surgery and decreases the chance of breathing complications.      How are the deep breathing exercises done?  Sit straight with your back supported.  Breathe in deeply and slowly through your nose. Your lower rib cage should expand and your abdomen may move forward.  Hold that breath for 3 to 5 seconds.  Breathe out through pursed lips, slowly and completely.  Rest and repeat 10 times every hour while awake.  Rest longer if you become dizzy or lightheaded.         Patient and Family Education             Ways You Can Help Prevent Blood Clots             This handout explains some simple things you can do to help prevent blood clots.      Blood clots are blockages that can form in the body's veins. When a blood clot forms in your deep veins, it may be called a deep vein thrombosis, or DVT for short. Blood clots can happen in any part of the body where blood flows, but they are most common in the arms and legs. If a piece of a blood clot breaks free and travels to the lungs, it is called a pulmonary embolus (PE). A PE can be a very  serious problem.         Being in the hospital or having surgery can raise your chances of getting a blood clot because you may not be well enough to move around as much as you normally do.         Ways you can help prevent blood clots in the hospital         Wearing SCDs. SCDs stands for Sequential Compression Devices.   SCDs are special sleeves that wrap around your legs  They attach to a pump that fills them with air to gently squeeze your legs every few minutes.   This helps return the blood in your legs to your heart.   SCDs should only be taken off when walking or bathing.   SCDs may not be comfortable, but they can help save your life.               Wearing compression stockings - if your doctor orders them. These special snug fitting stockings gently squeeze your legs to help blood flow.       Walking. Walking helps move the blood in your legs.   If your doctor says it is ok, try walking the halls at least   5 times a day. Ask us to help you get up, so you don't fall.      Taking any blood thinning medicines your doctor orders.        Page 1 of 2     [facility]; 3/23   Ways you can help prevent blood clots at home       Wearing compression stockings - if your doctor orders them. ? Walking - to help move the blood in your legs.       Taking any blood thinning medicines your doctor orders.      Signs of a blood clot or PE      Tell your doctor or nurse know right away if you have of the problems listed below.    If you are at home, seek medical care right away. Call 911 for chest pain or problems breathing.               Signs of a blood clot (DVT) - such as pain,  swelling, redness or warmth in your arm or leg      Signs of a pulmonary embolism (PE) - such as chest     pain or feeling short of breath

## 2025-05-06 LAB — STAPHYLOCOCCUS SPEC CULT: ABNORMAL

## 2025-05-08 DIAGNOSIS — M41.125 ADOLESCENT IDIOPATHIC SCOLIOSIS OF THORACOLUMBAR REGION: Primary | ICD-10-CM

## 2025-05-08 RX ORDER — MUPIROCIN 20 MG/G
OINTMENT TOPICAL 2 TIMES DAILY
Qty: 22 G | Refills: 0 | Status: SHIPPED | OUTPATIENT
Start: 2025-05-08 | End: 2025-05-13

## 2025-05-14 DIAGNOSIS — M41.129 ADOLESCENT IDIOPATHIC SCOLIOSIS, UNSPECIFIED SPINAL REGION: Primary | ICD-10-CM

## 2025-05-15 ENCOUNTER — HOSPITAL ENCOUNTER (OUTPATIENT)
Dept: RADIOLOGY | Facility: CLINIC | Age: 20
Discharge: HOME | End: 2025-05-15
Payer: COMMERCIAL

## 2025-05-15 DIAGNOSIS — M41.129 ADOLESCENT IDIOPATHIC SCOLIOSIS, UNSPECIFIED SPINAL REGION: ICD-10-CM

## 2025-05-15 PROCEDURE — 72083 X-RAY EXAM ENTIRE SPI 4/5 VW: CPT

## 2025-05-19 ENCOUNTER — ANESTHESIA EVENT (OUTPATIENT)
Dept: OPERATING ROOM | Facility: HOSPITAL | Age: 20
End: 2025-05-19
Payer: COMMERCIAL

## 2025-05-20 ENCOUNTER — HOSPITAL ENCOUNTER (INPATIENT)
Dept: NEUROLOGY | Facility: HOSPITAL | Age: 20
Discharge: HOME | End: 2025-05-20
Payer: COMMERCIAL

## 2025-05-20 ENCOUNTER — APPOINTMENT (OUTPATIENT)
Dept: RADIOLOGY | Facility: HOSPITAL | Age: 20
End: 2025-05-20
Payer: COMMERCIAL

## 2025-05-20 ENCOUNTER — HOSPITAL ENCOUNTER (INPATIENT)
Facility: HOSPITAL | Age: 20
LOS: 3 days | Discharge: HOME | End: 2025-05-23
Attending: ORTHOPAEDIC SURGERY | Admitting: ORTHOPAEDIC SURGERY
Payer: COMMERCIAL

## 2025-05-20 ENCOUNTER — ANESTHESIA (OUTPATIENT)
Dept: OPERATING ROOM | Facility: HOSPITAL | Age: 20
End: 2025-05-20
Payer: COMMERCIAL

## 2025-05-20 DIAGNOSIS — M41.125 ADOLESCENT IDIOPATHIC SCOLIOSIS OF THORACOLUMBAR REGION: ICD-10-CM

## 2025-05-20 DIAGNOSIS — G89.18 POSTOPERATIVE PAIN: ICD-10-CM

## 2025-05-20 DIAGNOSIS — M41.129 ADOLESCENT IDIOPATHIC SCOLIOSIS: Primary | ICD-10-CM

## 2025-05-20 LAB — PREGNANCY TEST URINE, POC: NEGATIVE

## 2025-05-20 PROCEDURE — 3600000009 HC OR TIME - EACH INCREMENTAL 1 MINUTE - PROCEDURE LEVEL FOUR: Performed by: ORTHOPAEDIC SURGERY

## 2025-05-20 PROCEDURE — A22610 PR ARTHRODESIS POSTERIOR/POSTEROLATERAL THORACIC: Performed by: ANESTHESIOLOGIST ASSISTANT

## 2025-05-20 PROCEDURE — 7100000001 HC RECOVERY ROOM TIME - INITIAL BASE CHARGE: Performed by: ORTHOPAEDIC SURGERY

## 2025-05-20 PROCEDURE — 0RGA071 FUSION OF THORACOLUMBAR VERTEBRAL JOINT WITH AUTOLOGOUS TISSUE SUBSTITUTE, POSTERIOR APPROACH, POSTERIOR COLUMN, OPEN APPROACH: ICD-10-PCS | Performed by: ORTHOPAEDIC SURGERY

## 2025-05-20 PROCEDURE — 2500000004 HC RX 250 GENERAL PHARMACY W/ HCPCS (ALT 636 FOR OP/ED): Mod: JZ | Performed by: NURSE PRACTITIONER

## 2025-05-20 PROCEDURE — 2500000004 HC RX 250 GENERAL PHARMACY W/ HCPCS (ALT 636 FOR OP/ED): Mod: JZ | Performed by: ANESTHESIOLOGY

## 2025-05-20 PROCEDURE — 3700000001 HC GENERAL ANESTHESIA TIME - INITIAL BASE CHARGE: Performed by: ORTHOPAEDIC SURGERY

## 2025-05-20 PROCEDURE — 3700000002 HC GENERAL ANESTHESIA TIME - EACH INCREMENTAL 1 MINUTE: Performed by: ORTHOPAEDIC SURGERY

## 2025-05-20 PROCEDURE — 99221 1ST HOSP IP/OBS SF/LOW 40: CPT | Performed by: NURSE PRACTITIONER

## 2025-05-20 PROCEDURE — 20930 SP BONE ALGRFT MORSEL ADD-ON: CPT | Performed by: ORTHOPAEDIC SURGERY

## 2025-05-20 PROCEDURE — 36620 INSERTION CATHETER ARTERY: CPT | Performed by: ANESTHESIOLOGY

## 2025-05-20 PROCEDURE — 2500000002 HC RX 250 W HCPCS SELF ADMINISTERED DRUGS (ALT 637 FOR MEDICARE OP, ALT 636 FOR OP/ED)

## 2025-05-20 PROCEDURE — 95940 IONM IN OPERATNG ROOM 15 MIN: CPT

## 2025-05-20 PROCEDURE — 71045 X-RAY EXAM CHEST 1 VIEW: CPT

## 2025-05-20 PROCEDURE — 2500000005 HC RX 250 GENERAL PHARMACY W/O HCPCS: Performed by: ANESTHESIOLOGIST ASSISTANT

## 2025-05-20 PROCEDURE — 2500000002 HC RX 250 W HCPCS SELF ADMINISTERED DRUGS (ALT 637 FOR MEDICARE OP, ALT 636 FOR OP/ED): Performed by: ANESTHESIOLOGIST ASSISTANT

## 2025-05-20 PROCEDURE — 2500000004 HC RX 250 GENERAL PHARMACY W/ HCPCS (ALT 636 FOR OP/ED): Mod: JZ | Performed by: ANESTHESIOLOGIST ASSISTANT

## 2025-05-20 PROCEDURE — C1713 ANCHOR/SCREW BN/BN,TIS/BN: HCPCS | Performed by: ORTHOPAEDIC SURGERY

## 2025-05-20 PROCEDURE — 7100000002 HC RECOVERY ROOM TIME - EACH INCREMENTAL 1 MINUTE: Performed by: ORTHOPAEDIC SURGERY

## 2025-05-20 PROCEDURE — 2500000005 HC RX 250 GENERAL PHARMACY W/O HCPCS: Performed by: ORTHOPAEDIC SURGERY

## 2025-05-20 PROCEDURE — 22802 ARTHRD PST DFRM 7-12 VRT SGM: CPT | Performed by: ORTHOPAEDIC SURGERY

## 2025-05-20 PROCEDURE — A22610 PR ARTHRODESIS POSTERIOR/POSTEROLATERAL THORACIC: Performed by: ANESTHESIOLOGY

## 2025-05-20 PROCEDURE — 2780000003 HC OR 278 NO HCPCS: Performed by: ORTHOPAEDIC SURGERY

## 2025-05-20 PROCEDURE — 22843 INSERT SPINE FIXATION DEVICE: CPT | Performed by: ORTHOPAEDIC SURGERY

## 2025-05-20 PROCEDURE — 1130000001 HC PRIVATE PED ROOM DAILY

## 2025-05-20 PROCEDURE — 3600000004 HC OR TIME - INITIAL BASE CHARGE - PROCEDURE LEVEL FOUR: Performed by: ORTHOPAEDIC SURGERY

## 2025-05-20 PROCEDURE — 2500000004 HC RX 250 GENERAL PHARMACY W/ HCPCS (ALT 636 FOR OP/ED): Performed by: ANESTHESIOLOGY

## 2025-05-20 PROCEDURE — 2720000007 HC OR 272 NO HCPCS: Performed by: ORTHOPAEDIC SURGERY

## 2025-05-20 PROCEDURE — 2500000004 HC RX 250 GENERAL PHARMACY W/ HCPCS (ALT 636 FOR OP/ED): Performed by: ORTHOPAEDIC SURGERY

## 2025-05-20 PROCEDURE — 0RG7071 FUSION OF 2 TO 7 THORACIC VERTEBRAL JOINTS WITH AUTOLOGOUS TISSUE SUBSTITUTE, POSTERIOR APPROACH, POSTERIOR COLUMN, OPEN APPROACH: ICD-10-PCS | Performed by: ORTHOPAEDIC SURGERY

## 2025-05-20 PROCEDURE — 20936 SP BONE AGRFT LOCAL ADD-ON: CPT | Performed by: ORTHOPAEDIC SURGERY

## 2025-05-20 PROCEDURE — P9045 ALBUMIN (HUMAN), 5%, 250 ML: HCPCS | Mod: JZ,TB | Performed by: ANESTHESIOLOGIST ASSISTANT

## 2025-05-20 PROCEDURE — 72020 X-RAY EXAM OF SPINE 1 VIEW: CPT

## 2025-05-20 DEVICE — SET SCREW, LARGE: Type: IMPLANTABLE DEVICE | Site: SPINE THORACIC | Status: FUNCTIONAL

## 2025-05-20 DEVICE — SCREW, PEDICLE 6.0 X 40 UNIAX 5.5/6.0: Type: IMPLANTABLE DEVICE | Site: SPINE THORACIC | Status: FUNCTIONAL

## 2025-05-20 DEVICE — BONE CHIPS,  CANCELL 30CC 1.7-10MM: Type: IMPLANTABLE DEVICE | Site: SPINE LUMBAR | Status: FUNCTIONAL

## 2025-05-20 DEVICE — ROD, 6.0 X 500 COCR, SINGLE HEX: Type: IMPLANTABLE DEVICE | Site: SPINE THORACIC | Status: FUNCTIONAL

## 2025-05-20 DEVICE — IMPLANTABLE DEVICE: Type: IMPLANTABLE DEVICE | Site: SPINE THORACIC | Status: FUNCTIONAL

## 2025-05-20 DEVICE — SCREW, PEDICLE 6.0 X 35 UNIAX 5.5/6.0: Type: IMPLANTABLE DEVICE | Site: SPINE THORACIC | Status: FUNCTIONAL

## 2025-05-20 RX ORDER — SODIUM CHLORIDE 0.9 G/100ML
INJECTION, SOLUTION IRRIGATION AS NEEDED
Status: DISCONTINUED | OUTPATIENT
Start: 2025-05-20 | End: 2025-05-20 | Stop reason: HOSPADM

## 2025-05-20 RX ORDER — ALBUMIN HUMAN 50 G/1000ML
SOLUTION INTRAVENOUS AS NEEDED
Status: DISCONTINUED | OUTPATIENT
Start: 2025-05-20 | End: 2025-05-20

## 2025-05-20 RX ORDER — MUPIROCIN CALCIUM 20 MG/G
CREAM TOPICAL AS NEEDED
Status: DISCONTINUED | OUTPATIENT
Start: 2025-05-20 | End: 2025-05-20 | Stop reason: HOSPADM

## 2025-05-20 RX ORDER — POLYETHYLENE GLYCOL 3350 17 G/17G
0.35 POWDER, FOR SOLUTION ORAL 2 TIMES DAILY
Status: DISCONTINUED | OUTPATIENT
Start: 2025-05-20 | End: 2025-05-23 | Stop reason: HOSPADM

## 2025-05-20 RX ORDER — APREPITANT 40 MG/1
CAPSULE ORAL AS NEEDED
Status: DISCONTINUED | OUTPATIENT
Start: 2025-05-20 | End: 2025-05-20

## 2025-05-20 RX ORDER — KETOROLAC TROMETHAMINE 30 MG/ML
INJECTION, SOLUTION INTRAMUSCULAR; INTRAVENOUS AS NEEDED
Status: DISCONTINUED | OUTPATIENT
Start: 2025-05-20 | End: 2025-05-20

## 2025-05-20 RX ORDER — ONDANSETRON HYDROCHLORIDE 2 MG/ML
8 INJECTION, SOLUTION INTRAVENOUS EVERY 6 HOURS SCHEDULED
Status: DISCONTINUED | OUTPATIENT
Start: 2025-05-20 | End: 2025-05-22

## 2025-05-20 RX ORDER — MIDAZOLAM HYDROCHLORIDE 1 MG/ML
INJECTION INTRAMUSCULAR; INTRAVENOUS AS NEEDED
Status: DISCONTINUED | OUTPATIENT
Start: 2025-05-20 | End: 2025-05-20

## 2025-05-20 RX ORDER — MORPHINE SULFATE 0.5 MG/ML
INJECTION, SOLUTION EPIDURAL; INTRATHECAL; INTRAVENOUS AS NEEDED
Status: DISCONTINUED | OUTPATIENT
Start: 2025-05-20 | End: 2025-05-20 | Stop reason: HOSPADM

## 2025-05-20 RX ORDER — SODIUM CHLORIDE, SODIUM LACTATE, POTASSIUM CHLORIDE, CALCIUM CHLORIDE 600; 310; 30; 20 MG/100ML; MG/100ML; MG/100ML; MG/100ML
INJECTION, SOLUTION INTRAVENOUS CONTINUOUS PRN
Status: DISCONTINUED | OUTPATIENT
Start: 2025-05-20 | End: 2025-05-20

## 2025-05-20 RX ORDER — KETOROLAC TROMETHAMINE 30 MG/ML
0.5 INJECTION, SOLUTION INTRAMUSCULAR; INTRAVENOUS EVERY 6 HOURS SCHEDULED
Status: DISCONTINUED | OUTPATIENT
Start: 2025-05-20 | End: 2025-05-21

## 2025-05-20 RX ORDER — HYDROMORPHONE HYDROCHLORIDE 1 MG/ML
0.2 INJECTION, SOLUTION INTRAMUSCULAR; INTRAVENOUS; SUBCUTANEOUS EVERY 10 MIN PRN
Status: DISCONTINUED | OUTPATIENT
Start: 2025-05-20 | End: 2025-05-20 | Stop reason: HOSPADM

## 2025-05-20 RX ORDER — CEFAZOLIN SODIUM 2 G/50ML
30 SOLUTION INTRAVENOUS ONCE
Status: COMPLETED | OUTPATIENT
Start: 2025-05-20 | End: 2025-05-20

## 2025-05-20 RX ORDER — DIAZEPAM 5 MG/ML
2 INJECTION, SOLUTION INTRAMUSCULAR; INTRAVENOUS EVERY 6 HOURS PRN
Status: DISCONTINUED | OUTPATIENT
Start: 2025-05-20 | End: 2025-05-21

## 2025-05-20 RX ORDER — PROPOFOL 10 MG/ML
INJECTION, EMULSION INTRAVENOUS CONTINUOUS PRN
Status: DISCONTINUED | OUTPATIENT
Start: 2025-05-20 | End: 2025-05-20

## 2025-05-20 RX ORDER — GLYCOPYRROLATE 0.2 MG/ML
INJECTION INTRAMUSCULAR; INTRAVENOUS AS NEEDED
Status: DISCONTINUED | OUTPATIENT
Start: 2025-05-20 | End: 2025-05-20

## 2025-05-20 RX ORDER — NALOXONE HYDROCHLORIDE 0.4 MG/ML
2 INJECTION, SOLUTION INTRAMUSCULAR; INTRAVENOUS; SUBCUTANEOUS EVERY 5 MIN PRN
Status: DISCONTINUED | OUTPATIENT
Start: 2025-05-20 | End: 2025-05-23 | Stop reason: HOSPADM

## 2025-05-20 RX ORDER — PHENYLEPHRINE HCL IN 0.9% NACL 0.4MG/10ML
SYRINGE (ML) INTRAVENOUS AS NEEDED
Status: DISCONTINUED | OUTPATIENT
Start: 2025-05-20 | End: 2025-05-20

## 2025-05-20 RX ORDER — SERTRALINE HYDROCHLORIDE 50 MG/1
50 TABLET, FILM COATED ORAL NIGHTLY
Status: DISCONTINUED | OUTPATIENT
Start: 2025-05-20 | End: 2025-05-23 | Stop reason: HOSPADM

## 2025-05-20 RX ORDER — CEFAZOLIN SODIUM 2 G/50ML
2 SOLUTION INTRAVENOUS EVERY 4 HOURS
Status: DISCONTINUED | OUTPATIENT
Start: 2025-05-20 | End: 2025-05-20 | Stop reason: SDUPTHER

## 2025-05-20 RX ORDER — MORPHINE SULFATE 0.5 MG/ML
INJECTION, SOLUTION EPIDURAL; INTRATHECAL; INTRAVENOUS AS NEEDED
Status: DISCONTINUED | OUTPATIENT
Start: 2025-05-20 | End: 2025-05-20

## 2025-05-20 RX ORDER — ONDANSETRON HYDROCHLORIDE 2 MG/ML
INJECTION, SOLUTION INTRAVENOUS AS NEEDED
Status: DISCONTINUED | OUTPATIENT
Start: 2025-05-20 | End: 2025-05-20

## 2025-05-20 RX ORDER — ACETAMINOPHEN 10 MG/ML
1000 INJECTION, SOLUTION INTRAVENOUS EVERY 6 HOURS SCHEDULED
Status: DISCONTINUED | OUTPATIENT
Start: 2025-05-20 | End: 2025-05-21

## 2025-05-20 RX ORDER — SERTRALINE HYDROCHLORIDE 50 MG/1
50 TABLET, FILM COATED ORAL DAILY
Status: DISCONTINUED | OUTPATIENT
Start: 2025-05-20 | End: 2025-05-20

## 2025-05-20 RX ORDER — SODIUM CHLORIDE, SODIUM LACTATE, POTASSIUM CHLORIDE, CALCIUM CHLORIDE 600; 310; 30; 20 MG/100ML; MG/100ML; MG/100ML; MG/100ML
100 INJECTION, SOLUTION INTRAVENOUS CONTINUOUS
Status: DISCONTINUED | OUTPATIENT
Start: 2025-05-20 | End: 2025-05-20 | Stop reason: HOSPADM

## 2025-05-20 RX ORDER — LIDOCAINE HYDROCHLORIDE 20 MG/ML
INJECTION, SOLUTION EPIDURAL; INFILTRATION; INTRACAUDAL; PERINEURAL AS NEEDED
Status: DISCONTINUED | OUTPATIENT
Start: 2025-05-20 | End: 2025-05-20

## 2025-05-20 RX ORDER — BACITRACIN ZINC 500 UNIT/G
OINTMENT IN PACKET (EA) TOPICAL AS NEEDED
Status: DISCONTINUED | OUTPATIENT
Start: 2025-05-20 | End: 2025-05-20 | Stop reason: HOSPADM

## 2025-05-20 RX ORDER — SCOPOLAMINE 1 MG/3D
1 PATCH, EXTENDED RELEASE TRANSDERMAL ONCE AS NEEDED
Status: DISCONTINUED | OUTPATIENT
Start: 2025-05-20 | End: 2025-05-23 | Stop reason: HOSPADM

## 2025-05-20 RX ORDER — ACETAMINOPHEN 10 MG/ML
INJECTION, SOLUTION INTRAVENOUS AS NEEDED
Status: DISCONTINUED | OUTPATIENT
Start: 2025-05-20 | End: 2025-05-20

## 2025-05-20 RX ORDER — HYDROMORPHONE HCL/0.9% NACL/PF 15 MG/30ML
PATIENT CONTROLLED ANALGESIA SYRINGE INTRAVENOUS CONTINUOUS
Refills: 0 | Status: DISCONTINUED | OUTPATIENT
Start: 2025-05-20 | End: 2025-05-21

## 2025-05-20 RX ORDER — FENTANYL CITRATE 50 UG/ML
INJECTION, SOLUTION INTRAMUSCULAR; INTRAVENOUS AS NEEDED
Status: DISCONTINUED | OUTPATIENT
Start: 2025-05-20 | End: 2025-05-20

## 2025-05-20 RX ORDER — VANCOMYCIN HYDROCHLORIDE 1 G/20ML
INJECTION, POWDER, LYOPHILIZED, FOR SOLUTION INTRAVENOUS AS NEEDED
Status: DISCONTINUED | OUTPATIENT
Start: 2025-05-20 | End: 2025-05-20 | Stop reason: HOSPADM

## 2025-05-20 RX ORDER — DIAZEPAM 5 MG/ML
2 INJECTION, SOLUTION INTRAMUSCULAR; INTRAVENOUS ONCE
Status: COMPLETED | OUTPATIENT
Start: 2025-05-20 | End: 2025-05-20

## 2025-05-20 RX ADMIN — ACETAMINOPHEN 1000 MG: 10 INJECTION, SOLUTION INTRAVENOUS at 15:36

## 2025-05-20 RX ADMIN — DEXAMETHASONE SODIUM PHOSPHATE 8 MG: 4 INJECTION, SOLUTION INTRA-ARTICULAR; INTRALESIONAL; INTRAMUSCULAR; INTRAVENOUS; SOFT TISSUE at 08:53

## 2025-05-20 RX ADMIN — ACETAMINOPHEN 900 MG: 10 INJECTION, SOLUTION INTRAVENOUS at 09:01

## 2025-05-20 RX ADMIN — SERTRALINE 50 MG: 50 TABLET, FILM COATED ORAL at 21:16

## 2025-05-20 RX ADMIN — CEFAZOLIN SODIUM 1800 MG: 2 SOLUTION INTRAVENOUS at 08:08

## 2025-05-20 RX ADMIN — PROPOFOL 250 MCG/KG/MIN: 10 INJECTION, EMULSION INTRAVENOUS at 07:57

## 2025-05-20 RX ADMIN — Medication 40 MCG: at 12:17

## 2025-05-20 RX ADMIN — PROPOFOL 50 MG: 10 INJECTION, EMULSION INTRAVENOUS at 08:23

## 2025-05-20 RX ADMIN — MORPHINE SULFATE 0.45 MG: 0.5 INJECTION, SOLUTION EPIDURAL; INTRATHECAL; INTRAVENOUS at 08:56

## 2025-05-20 RX ADMIN — SODIUM CHLORIDE, POTASSIUM CHLORIDE, SODIUM LACTATE AND CALCIUM CHLORIDE: 600; 310; 30; 20 INJECTION, SOLUTION INTRAVENOUS at 08:58

## 2025-05-20 RX ADMIN — POLYETHYLENE GLYCOL 3350 17 G: 17 POWDER, FOR SOLUTION ORAL at 21:16

## 2025-05-20 RX ADMIN — LIDOCAINE HYDROCHLORIDE 80 MG: 20 INJECTION, SOLUTION EPIDURAL; INFILTRATION; INTRACAUDAL; PERINEURAL at 07:51

## 2025-05-20 RX ADMIN — APREPITANT 40 MG: 40 CAPSULE ORAL at 07:39

## 2025-05-20 RX ADMIN — SODIUM CHLORIDE, POTASSIUM CHLORIDE, SODIUM LACTATE AND CALCIUM CHLORIDE: 600; 310; 30; 20 INJECTION, SOLUTION INTRAVENOUS at 07:38

## 2025-05-20 RX ADMIN — MIDAZOLAM HYDROCHLORIDE 2 MG: 1 INJECTION, SOLUTION INTRAMUSCULAR; INTRAVENOUS at 07:43

## 2025-05-20 RX ADMIN — ONDANSETRON 8 MG: 2 INJECTION INTRAMUSCULAR; INTRAVENOUS at 18:07

## 2025-05-20 RX ADMIN — Medication 0.2 MG/KG/HR: at 08:33

## 2025-05-20 RX ADMIN — TRANEXAMIC ACID 1000 MG: 100 INJECTION INTRAVENOUS at 08:41

## 2025-05-20 RX ADMIN — GLYCOPYRROLATE 0.2 MG: 0.2 INJECTION, SOLUTION INTRAMUSCULAR; INTRAVENOUS at 08:12

## 2025-05-20 RX ADMIN — DIAZEPAM 2 MG: 5 INJECTION, SOLUTION INTRAMUSCULAR; INTRAVENOUS at 13:15

## 2025-05-20 RX ADMIN — ONDANSETRON 8 MG: 2 INJECTION INTRAMUSCULAR; INTRAVENOUS at 12:06

## 2025-05-20 RX ADMIN — CEFAZOLIN SODIUM 1800 MG: 2 SOLUTION INTRAVENOUS at 12:02

## 2025-05-20 RX ADMIN — FENTANYL CITRATE 100 MCG: 50 INJECTION, SOLUTION INTRAMUSCULAR; INTRAVENOUS at 07:51

## 2025-05-20 RX ADMIN — KETOROLAC TROMETHAMINE 29.7 MG: 30 INJECTION, SOLUTION INTRAMUSCULAR; INTRAVENOUS at 18:07

## 2025-05-20 RX ADMIN — SCOPALAMINE 1 PATCH: 1 PATCH, EXTENDED RELEASE TRANSDERMAL at 18:40

## 2025-05-20 RX ADMIN — SODIUM CHLORIDE, POTASSIUM CHLORIDE, SODIUM LACTATE AND CALCIUM CHLORIDE: 600; 310; 30; 20 INJECTION, SOLUTION INTRAVENOUS at 10:06

## 2025-05-20 RX ADMIN — ALBUMIN HUMAN 250 ML: 0.05 INJECTION, SOLUTION INTRAVENOUS at 10:54

## 2025-05-20 RX ADMIN — KETOROLAC TROMETHAMINE 30 MG: 30 INJECTION, SOLUTION INTRAMUSCULAR; INTRAVENOUS at 12:20

## 2025-05-20 RX ADMIN — SODIUM CHLORIDE, POTASSIUM CHLORIDE, SODIUM LACTATE AND CALCIUM CHLORIDE: 600; 310; 30; 20 INJECTION, SOLUTION INTRAVENOUS at 08:03

## 2025-05-20 RX ADMIN — PROPOFOL 150 MG: 10 INJECTION, EMULSION INTRAVENOUS at 07:51

## 2025-05-20 RX ADMIN — SODIUM CHLORIDE 0.05 MCG/KG/MIN: 9 INJECTION, SOLUTION INTRAVENOUS at 08:09

## 2025-05-20 SDOH — SOCIAL STABILITY: SOCIAL INSECURITY: WITHIN THE LAST YEAR, HAVE YOU BEEN AFRAID OF YOUR PARTNER OR EX-PARTNER?: NO

## 2025-05-20 SDOH — ECONOMIC STABILITY: TRANSPORTATION INSECURITY: IN THE PAST 12 MONTHS, HAS LACK OF TRANSPORTATION KEPT YOU FROM MEDICAL APPOINTMENTS OR FROM GETTING MEDICATIONS?: NO

## 2025-05-20 SDOH — ECONOMIC STABILITY: INCOME INSECURITY: IN THE PAST 12 MONTHS HAS THE ELECTRIC, GAS, OIL, OR WATER COMPANY THREATENED TO SHUT OFF SERVICES IN YOUR HOME?: NO

## 2025-05-20 SDOH — ECONOMIC STABILITY: FOOD INSECURITY: HOW HARD IS IT FOR YOU TO PAY FOR THE VERY BASICS LIKE FOOD, HOUSING, MEDICAL CARE, AND HEATING?: NOT HARD AT ALL

## 2025-05-20 SDOH — SOCIAL STABILITY: SOCIAL INSECURITY: WITHIN THE LAST YEAR, HAVE YOU BEEN HUMILIATED OR EMOTIONALLY ABUSED IN OTHER WAYS BY YOUR PARTNER OR EX-PARTNER?: NO

## 2025-05-20 SDOH — ECONOMIC STABILITY: HOUSING INSECURITY: AT ANY TIME IN THE PAST 12 MONTHS, WERE YOU HOMELESS OR LIVING IN A SHELTER (INCLUDING NOW)?: NO

## 2025-05-20 SDOH — ECONOMIC STABILITY: HOUSING INSECURITY: IN THE LAST 12 MONTHS, WAS THERE A TIME WHEN YOU WERE NOT ABLE TO PAY THE MORTGAGE OR RENT ON TIME?: NO

## 2025-05-20 SDOH — SOCIAL STABILITY: SOCIAL INSECURITY
WITHIN THE LAST YEAR, HAVE YOU BEEN KICKED, HIT, SLAPPED, OR OTHERWISE PHYSICALLY HURT BY YOUR PARTNER OR EX-PARTNER?: NO

## 2025-05-20 SDOH — ECONOMIC STABILITY: FOOD INSECURITY: WITHIN THE PAST 12 MONTHS, THE FOOD YOU BOUGHT JUST DIDN'T LAST AND YOU DIDN'T HAVE MONEY TO GET MORE.: NEVER TRUE

## 2025-05-20 SDOH — ECONOMIC STABILITY: FOOD INSECURITY: WITHIN THE PAST 12 MONTHS, YOU WORRIED THAT YOUR FOOD WOULD RUN OUT BEFORE YOU GOT THE MONEY TO BUY MORE.: NEVER TRUE

## 2025-05-20 SDOH — SOCIAL STABILITY: SOCIAL INSECURITY: ABUSE: PEDIATRIC

## 2025-05-20 SDOH — SOCIAL STABILITY: SOCIAL INSECURITY
WITHIN THE LAST YEAR, HAVE YOU BEEN RAPED OR FORCED TO HAVE ANY KIND OF SEXUAL ACTIVITY BY YOUR PARTNER OR EX-PARTNER?: NO

## 2025-05-20 SDOH — SOCIAL STABILITY: SOCIAL INSECURITY: ARE THERE ANY APPARENT SIGNS OF INJURIES/BEHAVIORS THAT COULD BE RELATED TO ABUSE/NEGLECT?: NO

## 2025-05-20 SDOH — SOCIAL STABILITY: SOCIAL INSECURITY
ASK PARENT OR GUARDIAN: ARE THERE TIMES WHEN YOU, YOUR CHILD(REN), OR ANY MEMBER OF YOUR HOUSEHOLD FEEL UNSAFE, HARMED, OR THREATENED AROUND PERSONS WITH WHOM YOU KNOW OR LIVE?: NO

## 2025-05-20 SDOH — ECONOMIC STABILITY: HOUSING INSECURITY: IN THE PAST 12 MONTHS, HOW MANY TIMES HAVE YOU MOVED WHERE YOU WERE LIVING?: 0

## 2025-05-20 SDOH — SOCIAL STABILITY: SOCIAL INSECURITY: WERE YOU ABLE TO COMPLETE ALL THE BEHAVIORAL HEALTH SCREENINGS?: YES

## 2025-05-20 SDOH — ECONOMIC STABILITY: HOUSING INSECURITY: DO YOU FEEL UNSAFE GOING BACK TO THE PLACE WHERE YOU LIVE?: NO

## 2025-05-20 SDOH — SOCIAL STABILITY: SOCIAL INSECURITY: HAVE YOU HAD ANY THOUGHTS OF HARMING ANYONE ELSE?: NO

## 2025-05-20 ASSESSMENT — PAIN SCALES - GENERAL
PAINLEVEL_OUTOF10: 6
PAIN_LEVEL: 6
PAINLEVEL_OUTOF10: 4
PAINLEVEL_OUTOF10: 8
PAINLEVEL_OUTOF10: 0 - NO PAIN
PAINLEVEL_OUTOF10: 6
PAINLEVEL_OUTOF10: 6
PAINLEVEL_OUTOF10: 5 - MODERATE PAIN
PAINLEVEL_OUTOF10: 2
PAINLEVEL_OUTOF10: 8
PAINLEVEL_OUTOF10: 6

## 2025-05-20 ASSESSMENT — PAIN - FUNCTIONAL ASSESSMENT
PAIN_FUNCTIONAL_ASSESSMENT: 0-10
PAIN_FUNCTIONAL_ASSESSMENT: 0-10
PAIN_FUNCTIONAL_ASSESSMENT: UNABLE TO SELF-REPORT
PAIN_FUNCTIONAL_ASSESSMENT: 0-10

## 2025-05-20 ASSESSMENT — ACTIVITIES OF DAILY LIVING (ADL)
GROOMING: INDEPENDENT
ADEQUATE_TO_COMPLETE_ADL: YES
JUDGMENT_ADEQUATE_SAFELY_COMPLETE_DAILY_ACTIVITIES: YES
FEEDING YOURSELF: INDEPENDENT
HEARING - RIGHT EAR: FUNCTIONAL
DRESSING YOURSELF: INDEPENDENT
TOILETING: INDEPENDENT
PATIENT'S MEMORY ADEQUATE TO SAFELY COMPLETE DAILY ACTIVITIES?: YES
WALKS IN HOME: INDEPENDENT
BATHING: INDEPENDENT
HEARING - LEFT EAR: FUNCTIONAL
LACK_OF_TRANSPORTATION: NO

## 2025-05-20 NOTE — ANESTHESIA PROCEDURE NOTES
Peripheral IV  Date/Time: 5/20/2025 8:03 AM  Inserted by: KAYLIN Ahmadi    Placement  Needle size: 18 G  Laterality: left  Location: hand  Local anesthetic: none  Site prep: chlorhexidine  Technique: anatomical landmarks  Attempts: 1

## 2025-05-20 NOTE — ANESTHESIA PROCEDURE NOTES
Airway  Date/Time: 5/20/2025 7:53 AM  Reason: elective    Airway not difficult    Staffing  Performed: KAYLIN   Authorized by: Stacy Perdue MD    Performed by: KAYLIN Ahmadi  Patient location during procedure: OR    Patient Condition  Indications for airway management: anesthesia  Patient position: sniffing  MILS not maintained throughout  Planned trial extubation  Sedation level: deep     Final Airway Details   Preoxygenated: yes  Final airway type: endotracheal airway  Successful airway: ETT  Cuffed: yes   Successful intubation technique: direct laryngoscopy  Endotracheal tube insertion site: oral  Blade: Carlos Enrique  Blade size: #3  ETT size (mm): 6.5  Cormack-Lehane Classification: grade I - full view of glottis  Placement verified by: chest auscultation and capnometry   Measured from: lips  ETT to lips (cm): 20  Number of attempts at approach: 1  Number of other approaches attempted: 0

## 2025-05-20 NOTE — ANESTHESIA POSTPROCEDURE EVALUATION
Patient: Mariana Jensen    Procedure Summary       Date: 05/20/25 Room / Location: [facility] [place] OR 07 / [facility]    Anesthesia Start: 0745 Anesthesia Stop: 1255    Procedure: Posterior spinal fusion with instrumentation and bone graft (Spine Thoracic) Diagnosis:       Adolescent idiopathic scoliosis of thoracolumbar region      (Adolescent idiopathic scoliosis of thoracolumbar region [M41.125])    Surgeons: Carmen Jones MD Responsible Provider: Stacy Perdue MD    Anesthesia Type: general ASA Status: 2            Anesthesia Type: general    Vitals Value Taken Time   /62 05/20/25 13:33   Temp 36 °C (96.8 °F) 05/20/25 12:48   Pulse 77 05/20/25 13:33   Resp 22 05/20/25 13:33   SpO2 99 % 05/20/25 13:33       Anesthesia Post Evaluation    Patient location during evaluation: PACU  Patient participation: complete - patient participated  Level of consciousness: awake and alert  Pain score: 6  Pain management: adequate  Multimodal analgesia pain management approach  Airway patency: patent  Cardiovascular status: acceptable  Respiratory status: spontaneous ventilation and room air  Hydration status: acceptable  Postoperative Nausea and Vomiting: none  Comments: Trying sips of clears in PACU, complaining of mid/low back pain, had recently received Valium.         No notable events documented.

## 2025-05-20 NOTE — PROGRESS NOTES
Orthopaedic Surgery Progress Note    Subjective:  Resting comfortably in PACU. No acute issues. Pain controlled. Denies N/V. Denies SOB/chest pain. Denies N/T.    Objective:  Vitals:    05/20/25 1248   BP: 99/58   Pulse: 92   Resp:    Temp:    SpO2: 99%     Physical Exam  - Constitutional: No acute distress, cooperative  - Eyes: EOM grossly intact  - Head/Neck: Trachea midline  - Respiratory/Thorax: NWOB  - Cardiovascular: RRR on peripheral palpation  - Gastrointestinal: Nondistended  - Psychological: Appropriate mood/behavior  - Skin: Warm and dry. Additional findings in musculoskeletal evaluation  - Musculoskeletal:  ---  Postoperative dressings in place without strikethrough bleeding  Drain in place holding suction, serosanginous output    L1: SILT       L2: SILT      Hip flexors 5/5 Left; 5/5 Right  L3: SILT      Knee extension 5/5 Left; 5/5 Right  L4: SILT      Tib Ant. (Dorsiflexion) 5/5 Left; 5/5 Right  L5: SILT      EHL5/5 Left; 5/5 Right  S1: SILT      Plantar flexion 5/5 Left; 5/5 Right      Results for orders placed or performed during the hospital encounter of 05/20/25 (from the past 24 hours)   POCT pregnancy, urine manually resulted   Result Value Ref Range    Preg Test, Ur Negative Negative       FL fluoro images no charge   Final Result      XR lumbar spine 1 view   Final Result      XR chest 1 view   Final Result      XR chest 1 view    (Results Pending)       Assessment:  20 year old with AIS now s/p T5-L3 PSF with Dr. Jones on 5/20. Recovering appropriately.     Plan:  - Weight-bearing status: OOB as tolerated, AAT  - Feeding: Regular diet as tolerated, bowel regimen  - Analgesia: Per Pain Management Team   Appreciate recs  - Volume: mIV @ at  cc/hr, HLIVF when tolerating PO, monitor BMP  - OT/PT: Consulted  - Respiratory: Encourage IS, maintain O2 sat >92%  - Infection: Radha-operative Ancef for 24 hours, afebrile   - Lines: Maintain PIVx2 while inpatient  - Drains: Hemovac x1, please  document output q8h  - Ely: Yes, potential DC POD1  - Embolic ppx: SCDs only  - Transfusion: AM CBC, no current indication for transfusion  - Cardiac: No issues  - Glycemic: No issues  - C/w home medications  - Dispo pending PT, pain control    D/w Dr. Jones    This patient will be followed by the Orthopaedic Pediatric service. Please page or Epic Chat the corresponding residents below with questions or concerns.     Ortho Pediatric Service (Epic Chat Preferred)  First call: Prosper Coto MD PGY-1  First call: Rhona Henriquez MD PGY-1  Second call: Osvaldo Li PGY-2  Third call: Kriss Quiroz PGY-3    6pm-6am M-F, Holidays, and weekends page Ortho on-call @[phone] with urgent questions/concerns.     Osvaldo Li MD  Orthopaedic Surgery PGY-2  On-call pager [phone]

## 2025-05-20 NOTE — ANESTHESIA PREPROCEDURE EVALUATION
Patient: Mariana Jensen    Procedure Information       Anesthesia Start Date/Time: 05/20/25 0745    Procedure: Posterior spinal fusion with instrumentation and bone graft (Spine Thoracic) - 6 hours in the room, Will need one month follow up    Location: [facility]  / [facility]    Surgeons: Carmen Jones MD            Relevant Problems   Anesthesia (within normal limits)      Cardiac (within normal limits)      Pulmonary (within normal limits)      Neuro   (+) Anxiety      GI (within normal limits)      /Renal (within normal limits)      Liver (within normal limits)      Endocrine (within normal limits)      Hematology   (+) Anemia      Musculoskeletal   (+) Adolescent idiopathic scoliosis   (+) Scoliosis       Clinical information reviewed:   Tobacco  Allergies  Meds   Med Hx  Surg Hx  OB Status  Fam Hx  Soc   Hx        NPO Detail:  NPO/Void Status  Date of Last Liquid: 05/20/25  Time of Last Liquid: 0600  Date of Last Solid: 05/19/25  Time of Last Solid: 2300  Last Intake Type: Clear fluids         Physical Exam    Airway  Mallampati: II  TM distance: >3 FB  Neck ROM: full  Mouth opening: 3 or more finger widths     Cardiovascular   Rhythm: regular  Rate: normal     Dental - normal exam  Comments: Lower permanent retainer     Pulmonary Breath sounds clear to auscultation     Abdominal            Anesthesia Plan    History of general anesthesia?: yes  History of complications of general anesthesia?: no    ASA 2     general     intravenous induction   Postoperative pain plan includes opioids.  Trial extubation is planned.  Anesthetic plan and risks discussed with patient, father and mother.  Use of blood products discussed with patient who consented to blood products.    Plan discussed with CAA.

## 2025-05-20 NOTE — H&P
History Of Present Illness  Mariana Jensen is a 20 y.o. female presenting with AIS.     Past Medical History  She has a past medical history of Anemia, Ankle sprain, Anxiety, Anxiety disorder, unspecified (06/13/2022), Asthma, Fracture of hand, Fracture of wrist, Migraine, Scoliosis, Spider angioma, Unspecified asthma with (acute) exacerbation (HHS-HCC) (10/07/2013), Unspecified asthma with (acute) exacerbation (HHS-HCC) (12/06/2018), Unspecified injury of unspecified wrist, hand and finger(s), initial encounter (12/06/2018), and Vision loss.    Surgical History  She has a past surgical history that includes Fracture surgery; Wrist surgery (03/13/2019); and Laser ablation of vascular lesion (01/08/2016).     Social History  She reports that she has never smoked. She has never used smokeless tobacco. She reports that she does not currently use alcohol. She reports that she does not use drugs.    Family History  Family History[1]     Allergies  Penicillins    Review of Systems   All other systems reviewed and are negative.       Physical Exam  Constitutional:       Appearance: Normal appearance. She is normal weight.   HENT:      Head: Normocephalic and atraumatic.   Cardiovascular:      Rate and Rhythm: Normal rate.      Pulses: Normal pulses.   Pulmonary:      Effort: Pulmonary effort is normal.   Abdominal:      General: Abdomen is flat.      Palpations: Abdomen is soft.   Neurological:      Mental Status: She is alert.          Last Recorded Vitals  There were no vitals taken for this visit.    Relevant Results      Scheduled medications  Scheduled Medications[2]  Continuous medications  Continuous Medications[3]  PRN medications  PRN Medications[4]  No results found for this or any previous visit (from the past 24 hours).    Assessment/Plan   Assessment & Plan  Scoliosis      Plan for posterior spinal fusion.      Carmen Jones MD         [1]   Family History  Problem Relation Name Age of Onset     Asthma Mother      Allergy (severe) Mother      Migraines Mother      Asthma Father      Migraines Father      Hypertension Father      Allergy (severe) Father      Scoliosis Father      Scoliosis Sister      Hyperlipidemia Maternal Grandmother      Hypertension Maternal Grandmother      Cancer Maternal Grandmother      Other (acute myocardial infarction) Paternal Grandmother      Migraines Paternal Grandfather      Hyperlipidemia Paternal Grandfather      Diabetes Mother's Brother      Rheumatologic disease Father's Brother     [2] ceFAZolin, 30 mg/kg, intravenous, Once    [3] ketamine, 0.2 mg/kg/hr (Dosing Weight)  remifentanil (Ultiva) 1,000 mcg in sodium chloride 0.9% 20 mL (50 mcg/mL) infusion, 0.25 mcg/kg/min (Dosing Weight)  tranexamic acid, 10 mg/kg/hr (Dosing Weight)    [4]

## 2025-05-20 NOTE — ANESTHESIA PROCEDURE NOTES
Arterial Line:    Date/Time: 5/20/2025 8:04 AM    Staffing  Performed: attending   Authorized by: Stacy Perdue MD    Performed by: KAYLIN Ahmadi    An arterial line was placed. Procedure performed using surface landmarks.in the OR for the following indication(s): continuous blood pressure monitoring.    A 20 gauge (size), 1 and 1/4 inch (length), Angiocath (type) catheter was placed into the Right radial artery, secured by Tegaderm,   Seldinger technique not used.  Events:  patient tolerated procedure well with no complications.

## 2025-05-20 NOTE — CONSULTS
Consults    CONSULT NOTE    Reason For Consult  Pain Management: post-op pain  PCA    Consult Requested By: Carmen Jones    Reviewed the following notes: History and Physical, Pediatric Orthopedics, and Primary Care Notes    History Of Present Illness  Mariana Jensen is a 20 y.o. female with a history of  Adolescent Idiopathic Scoliosis, Anxiety, Migraines, Anemia. Currently in the OR and to be admitted s/p PSF.     Epidural or regional anesthesia: DuraMorph per the surgeon      Past Medical History  She has a past medical history of Anemia, Ankle sprain, Anxiety, Anxiety disorder, unspecified (06/13/2022), Asthma, Fracture of hand, Fracture of wrist, Migraine, Scoliosis, Spider angioma, Unspecified asthma with (acute) exacerbation ([facility]) (10/07/2013), Unspecified asthma with (acute) exacerbation ([facility]) (12/06/2018), Unspecified injury of unspecified wrist, hand and finger(s), initial encounter (12/06/2018), and Vision loss.    Surgical History  She has a past surgical history that includes Fracture surgery; Wrist surgery (03/13/2019); and Laser ablation of vascular lesion (01/08/2016).     Social History  She reports that she has never smoked. She has never used smokeless tobacco. She reports that she does not currently use alcohol. She reports that she does not use drugs.    Family History  Family History[1]     Allergies  Penicillins    Immunizations  Immunization History   Administered Date(s) Administered    COVID-19, mRNA, LNP-S, PF, 30 mcg/0.3 mL dose 05/26/2021, 06/16/2021    DTaP HepB IPV combined vaccine, pedatric (PEDIARIX) 2004, 2004, 2004, 08/26/2005    DTaP IPV combined vaccine (KINRIX, QUADRACEL) 06/02/2009    Flu vaccine (IIV4), preservative free *Check age/dose* 09/21/2016, 09/23/2017    Flu vaccine, trivalent, preservative free, age 6 months and greater (Fluarix/Fluzone/Flulaval) 11/18/2005, 10/22/2009    HPV 9-valent vaccine (GARDASIL 9) 07/21/2016, 01/24/2017     HPV, Quadrivalent 09/21/2016    Hepatitis A vaccine, pediatric/adolescent (HAVRIX, VAQTA) 06/10/2014, 12/11/2014    Hib (HbOC) 2004, 2004, 2004, 08/26/2005    Influenza Whole 2004, 01/04/2005    Influenza, Unspecified 11/10/2009, 12/17/2009    Influenza, live, intranasal 10/13/2015    Influenza, seasonal, injectable 11/21/2007, 09/30/2008, 09/17/2012, 10/07/2013, 12/11/2014    MMR vaccine, subcutaneous (MMR II) 05/26/2005, 06/02/2009    Meningococcal ACWY-D (Menactra) 4-valent conjugate vaccine 07/21/2016, 05/13/2021    Novel influenza-H1N1-09, preservative-free 11/10/2009, 12/17/2009    Pfizer Purple Cap SARS-CoV-2 07/27/2022    Pneumococcal Conjugate PCV 7 2004, 2004, 2004, 08/26/2005    Tdap vaccine, age 7 year and older (BOOSTRIX, ADACEL) 07/21/2016    Varicella vaccine, subcutaneous (VARIVAX) 05/26/2005, 06/02/2009       Objective  Last Recorded Vitals  Blood pressure 115/80, pulse 76, temperature 36.8 °C (98.2 °F), temperature source Temporal, resp. rate 18, weight 59.6 kg (131 lb 6.3 oz), last menstrual period 05/01/2025, SpO2 100%.    Pain Assessment  0-10 (Numeric) Pain Score: 0 - No pain    PACU Pain Assessments  Pain Assessment  Pain Assessment: 0-10 (5/20/2025  7:09 AM)  0-10 (Numeric) Pain Score: 0 - No pain (5/20/2025  7:09 AM)      Physical:   Constitutional: Asleep at the time of assessment, appears to be comfortable at the time of assessment  Skin: Clean dry and intact No rash No s/sx of pruritis  Eyes: Asleep  Resp: Patient is on RA, no work of breathing, easy unlabored respirations  Card: Regular rate and rhythm per CR monitor Pink, warm and well perfused  Gastrointestinal: Patient currently NPO No BM in the past 24 hours  Genitourinary: Positive urine output Ely in place  Musculoskeletal: SAME  Extremities: FROM  Neurological: Asleep  Psychological: No family at bedside at the time of assessment    Relevant Results      Scheduled medications  Scheduled  Medications[2]  Continuous medications  Continuous Medications[3]  PRN medications  PRN Medications[4]     Results for orders placed or performed during the hospital encounter of 05/20/25 (from the past 24 hours)   POCT pregnancy, urine manually resulted   Result Value Ref Range    Preg Test, Ur Negative Negative      XR lumbar spine 1 view  Result Date: 5/20/2025  These images are not reportable by radiology and will not be interpreted by  Radiologists.    XR chest 1 view  Result Date: 5/20/2025  These images are not reportable by radiology and will not be interpreted by  Radiologists.    XR full spine 4 view scoliosis  Result Date: 5/17/2025  Interpreted By:  Dejon Quinones, STUDY: XR FULL SPINE 4 VIEW SCOLIOSIS; ;  5/15/2025 3:40 pm   INDICATION: Signs/Symptoms:pre-op scoliosis xrays.   ,M41.129 Adolescent idiopathic scoliosis, site unspecified   COMPARISON: 08/09/2024   ACCESSION NUMBER(S): [id number]   ORDERING CLINICIAN: SHAILESH JONES   FINDINGS: Two views of the whole spine.   There is S shaped curvature of the thoracolumbar spine with thoracic levocurvature centered at T6 and lumbar dextrocurvature centered at L1. There is mild multilevel lumbar discogenic degenerative change. The soft tissues are unremarkable.       S shaped thoracolumbar curvature, as above.   MACRO: None   Signed by: Dejon Quinones 5/17/2025 12:38 PM Dictation workstation:   [id number]       Assessment and Plan    Assessment  Mariana Jensen is a 20 y.o. female with a history of  Adolescent Idiopathic Scoliosis and Anxiety, Migraines, Anemia. Currently in the OR and to be admitted s/p PSF.  Pediatric pain service consulted to help optimize overall pain level.      Plan  Dilaudid PCA   - DuraMorph to be given per Dr. Jones, please start PCA 15 hours after the DuraMorph was given, if patient has an increase in pain prior to this time please contact the pain service  Tylenol IV Q6 and Ketorolac IV Q6  Valium IV Q6 PRN muscle  spasms/anxiety   Narcan gtt, Zofran IV Q6, Miralax BID for side effect management   Follow pain scores per policy  Will continue to follow, please page with questions or concerns ([id number])                  [1]   Family History  Problem Relation Name Age of Onset    Asthma Mother      Allergy (severe) Mother      Migraines Mother      Asthma Father      Migraines Father      Hypertension Father      Allergy (severe) Father      Scoliosis Father      Scoliosis Sister      Hyperlipidemia Maternal Grandmother      Hypertension Maternal Grandmother      Cancer Maternal Grandmother      Other (acute myocardial infarction) Paternal Grandmother      Migraines Paternal Grandfather      Hyperlipidemia Paternal Grandfather      Diabetes Mother's Brother      Rheumatologic disease Father's Brother     [2]    [3] ketamine, 0.2 mg/kg/hr (Dosing Weight), Last Rate: 0.2 mg/kg/hr (05/20/25 0833)  remifentanil (Ultiva) 1,000 mcg in sodium chloride 0.9% 20 mL (50 mcg/mL) infusion, 0.25 mcg/kg/min (Dosing Weight), Last Rate: 0.2 mcg/kg/min (05/20/25 0914)  tranexamic acid, 10 mg/kg/hr (Dosing Weight), Last Rate: 10 mg/kg/hr (05/20/25 0911)  [4] PRN medications: EPINEPHrine HCl (PF) (Adrenalin) 0.5 mg in sodium chloride 0.9% 250 mL subcutaneous injection, morphine PF, mupirocin

## 2025-05-20 NOTE — BRIEF OP NOTE
Date: 2025  OR Location: [facility] OR    Name: Mariana Jensen, : 2005, Age: 20 y.o., MRN: [id number], Sex: female    Diagnosis  Pre-op Diagnosis      * Adolescent idiopathic scoliosis of thoracolumbar region [M41.125] Post-op Diagnosis     * Adolescent idiopathic scoliosis of thoracolumbar region [M41.125]     Procedures  Posterior spinal fusion with instrumentation and bone graft  51201 - [place] ARTHRODESIS POSTERIOR SPINAL DFRM 13+ VRT SGM    Posterior spinal fusion with instrumentation and bone graft  11041 - [place] POSTERIOR SEGMENTAL INSTRUMENTATION 13/> VRT SE    Posterior spinal fusion with instrumentation and bone graft  86609 - [place] AUTOGRAFT SPINE SURGERY LOCAL FROM SAME INCISION    Posterior spinal fusion with instrumentation and bone graft   - [place] ALLOGRAFT FOR SPINE SURGERY ONLY MORSELIZED    [place] STEREOTACTIC COMPUTER ASSISTED PX SPINAL [16445]  Surgeons      * Carmen Jones - Primary    Resident/Fellow/Other Assistant:  Surgeons and Role:     * Osvaldo Li MD - Resident - Assisting    Staff:   Alkaulator: Bailee  Circulator: Carmen Ramires Person: Michael Lemonub Person: Terrance    Anesthesia Staff: Anesthesiologist: Stacy Perdue MD  C-AA: KAYLIN Ahmadi    Procedure Summary  Anesthesia: General  ASA: II  Estimated Blood Loss: 200mL  Intra-op Medications:   Administrations occurring from 0730 to 1345 on 25:   Medication Name Total Dose   mupirocin (Bactroban) 2 % cream 1 Application   vancomycin (Vancocin) vial for injection 1 g   morphine PF (Duramorph) injection 0.45 mg   EPINEPHrine HCl (PF) (Adrenalin) 0.5 mg in sodium chloride 0.9% 250 mL subcutaneous injection 0.5 mg   sodium chloride 0.9 % irrigation solution 600 mL   bacitracin ointment 1 Application   acetaminophen (Ofirmev) injection 900 mg   albumin human 5 % 250 mL   aprepitant (Emend) capsule 40 mg   dexAMETHasone (Decadron) 4 mg/mL IV Syringe 2 mL 8 mg   fentaNYL (Sublimaze) injection 50 mcg/mL 100 mcg    glycopyrrolate (Robinul) injection 0.2 mg   ketamine (Ketalar) 1,000 mg in dextrose 5% 100 mL infusion 42.57 mg   ketorolac (Toradol) injection 30 mg 30 mg   LR bolus Cannot be calculated   lactated Ringer's infusion Cannot be calculated   lidocaine PF (Xylocaine-MPF) local injection 2 % 80 mg   midazolam PF (Versed) injection 1 mg/mL 2 mg   ondansetron (Zofran) 2 mg/mL injection 8 mg   phenylephrine 40 mcg/mL syringe 10 mL 40 mcg   propofol (Diprivan) injection 10 mg/mL 2,696.29 mg   remifentanil (Ultiva) 1,000 mcg in sodium chloride 0.9% 20 mL (50 mcg/mL) infusion 1.84 mg   tranexamic acid (Cyklokapron) 5,000 mg in dextrose 5% 250 mL (20 mg/mL) infusion 2,623.6 mg   ceFAZolin (Ancef) 1,800 mg in dextrose (iso) IV 45 mL 3,600 mg              Anesthesia Record               Intraprocedure I/O Totals          Intake    Ketamine 4.26 mL    The total shown is the total volume documented since Anesthesia Start was filed.    LR bolus 1000.00 mL    Remifentanil Drip 36.77 mL    The total shown is the total volume documented since Anesthesia Start was filed.    Tranexamic Acid 131.18 mL    The total shown is the total volume documented since Anesthesia Start was filed.    lactated Ringer's 1000.00 mL    acetaminophen 1,000 mg/100 mL (10 mg/mL) 90.00 mL    albumin human 5 % 250.00 mL    ceFAZolin (Ancef) 1,800 mg in dextrose (iso) IV 45 mL 90.00 mL    Cell Saver 85 mL    Total Intake 2687.21 mL       Output    Urine 275 mL    Est. Blood Loss 250 mL    Total Output 525 mL       Net    Net Volume 2162.21 mL          Specimen: No specimens collected       Findings: Scoliosis - AIS    Complications:  None; patient tolerated the procedure well.     Disposition: PACU - hemodynamically stable.  Condition: stable  Specimens Collected: No specimens collected  Attending Attestation: I was present and scrubbed for the entire procedure.    Carmen Jones  Phone Number: [phone]

## 2025-05-20 NOTE — ANESTHESIA PROCEDURE NOTES
Peripheral IV  Date/Time: 5/20/2025 7:35 AM  Inserted by: KAYLIN Ahmadi    Placement  Needle size: 20 G  Laterality: right  Location: hand  Local anesthetic: topical anesthetic  Site prep: chlorhexidine  Technique: anatomical landmarks  Attempts: 1

## 2025-05-21 PROCEDURE — 2500000004 HC RX 250 GENERAL PHARMACY W/ HCPCS (ALT 636 FOR OP/ED)

## 2025-05-21 PROCEDURE — 2500000004 HC RX 250 GENERAL PHARMACY W/ HCPCS (ALT 636 FOR OP/ED): Mod: JZ

## 2025-05-21 PROCEDURE — 97161 PT EVAL LOW COMPLEX 20 MIN: CPT | Mod: GP

## 2025-05-21 PROCEDURE — 1130000001 HC PRIVATE PED ROOM DAILY

## 2025-05-21 PROCEDURE — 2500000001 HC RX 250 WO HCPCS SELF ADMINISTERED DRUGS (ALT 637 FOR MEDICARE OP): Performed by: NURSE PRACTITIONER

## 2025-05-21 PROCEDURE — 97530 THERAPEUTIC ACTIVITIES: CPT | Mod: GP

## 2025-05-21 PROCEDURE — 97110 THERAPEUTIC EXERCISES: CPT | Mod: GP

## 2025-05-21 PROCEDURE — 2500000002 HC RX 250 W HCPCS SELF ADMINISTERED DRUGS (ALT 637 FOR MEDICARE OP, ALT 636 FOR OP/ED)

## 2025-05-21 PROCEDURE — 2500000004 HC RX 250 GENERAL PHARMACY W/ HCPCS (ALT 636 FOR OP/ED): Mod: JZ | Performed by: NURSE PRACTITIONER

## 2025-05-21 RX ORDER — HYDROMORPHONE HYDROCHLORIDE 1 MG/ML
0.25 INJECTION, SOLUTION INTRAMUSCULAR; INTRAVENOUS; SUBCUTANEOUS EVERY 2 HOUR PRN
Status: DISCONTINUED | OUTPATIENT
Start: 2025-05-21 | End: 2025-05-23 | Stop reason: HOSPADM

## 2025-05-21 RX ORDER — ACETAMINOPHEN 325 MG/1
650 TABLET ORAL EVERY 6 HOURS
Status: DISCONTINUED | OUTPATIENT
Start: 2025-05-21 | End: 2025-05-23 | Stop reason: HOSPADM

## 2025-05-21 RX ORDER — NAPROXEN 250 MG/1
375 TABLET ORAL EVERY 12 HOURS SCHEDULED
Status: DISCONTINUED | OUTPATIENT
Start: 2025-05-21 | End: 2025-05-23 | Stop reason: HOSPADM

## 2025-05-21 RX ORDER — DIAZEPAM 2 MG/1
2 TABLET ORAL EVERY 6 HOURS PRN
Status: DISCONTINUED | OUTPATIENT
Start: 2025-05-21 | End: 2025-05-23 | Stop reason: HOSPADM

## 2025-05-21 RX ORDER — PROCHLORPERAZINE EDISYLATE 5 MG/ML
5 INJECTION INTRAMUSCULAR; INTRAVENOUS EVERY 6 HOURS PRN
Status: DISCONTINUED | OUTPATIENT
Start: 2025-05-21 | End: 2025-05-23 | Stop reason: HOSPADM

## 2025-05-21 RX ORDER — OXYCODONE HYDROCHLORIDE 5 MG/1
7.5 TABLET ORAL EVERY 6 HOURS SCHEDULED
Refills: 0 | Status: DISCONTINUED | OUTPATIENT
Start: 2025-05-21 | End: 2025-05-23 | Stop reason: HOSPADM

## 2025-05-21 RX ORDER — ACETAMINOPHEN 325 MG/1
650 TABLET ORAL EVERY 6 HOURS
Status: DISCONTINUED | OUTPATIENT
Start: 2025-05-21 | End: 2025-05-21

## 2025-05-21 RX ORDER — OXYCODONE HYDROCHLORIDE 5 MG/1
5 TABLET ORAL EVERY 4 HOURS PRN
Refills: 0 | Status: DISCONTINUED | OUTPATIENT
Start: 2025-05-21 | End: 2025-05-23 | Stop reason: HOSPADM

## 2025-05-21 RX ORDER — DIAZEPAM 2 MG/1
2 TABLET ORAL EVERY 6 HOURS PRN
Status: DISCONTINUED | OUTPATIENT
Start: 2025-05-21 | End: 2025-05-21

## 2025-05-21 RX ORDER — BISACODYL 5 MG
5 TABLET, DELAYED RELEASE (ENTERIC COATED) ORAL 2 TIMES DAILY
Status: DISCONTINUED | OUTPATIENT
Start: 2025-05-22 | End: 2025-05-23 | Stop reason: HOSPADM

## 2025-05-21 RX ORDER — CEFAZOLIN SODIUM 2 G/50ML
30 SOLUTION INTRAVENOUS EVERY 8 HOURS
Status: COMPLETED | OUTPATIENT
Start: 2025-05-21 | End: 2025-05-21

## 2025-05-21 RX ADMIN — NAPROXEN 375 MG: 250 TABLET ORAL at 20:45

## 2025-05-21 RX ADMIN — OXYCODONE 7.5 MG: 5 TABLET ORAL at 12:05

## 2025-05-21 RX ADMIN — ONDANSETRON 8 MG: 2 INJECTION INTRAMUSCULAR; INTRAVENOUS at 18:00

## 2025-05-21 RX ADMIN — CEFAZOLIN SODIUM 1800 MG: 2 SOLUTION INTRAVENOUS at 16:15

## 2025-05-21 RX ADMIN — HYDROMORPHONE HYDROCHLORIDE: 10 INJECTION, SOLUTION INTRAMUSCULAR; INTRAVENOUS; SUBCUTANEOUS at 00:09

## 2025-05-21 RX ADMIN — KETOROLAC TROMETHAMINE 29.7 MG: 30 INJECTION, SOLUTION INTRAMUSCULAR; INTRAVENOUS at 00:12

## 2025-05-21 RX ADMIN — POLYETHYLENE GLYCOL 3350 17 G: 17 POWDER, FOR SOLUTION ORAL at 09:00

## 2025-05-21 RX ADMIN — NAPROXEN 375 MG: 250 TABLET ORAL at 12:05

## 2025-05-21 RX ADMIN — ONDANSETRON 8 MG: 2 INJECTION INTRAMUSCULAR; INTRAVENOUS at 00:12

## 2025-05-21 RX ADMIN — DIAZEPAM 2 MG: 5 INJECTION, SOLUTION INTRAMUSCULAR; INTRAVENOUS at 02:02

## 2025-05-21 RX ADMIN — ONDANSETRON 8 MG: 2 INJECTION INTRAMUSCULAR; INTRAVENOUS at 23:58

## 2025-05-21 RX ADMIN — OXYCODONE 7.5 MG: 5 TABLET ORAL at 18:00

## 2025-05-21 RX ADMIN — ACETAMINOPHEN 650 MG: 325 TABLET ORAL at 12:05

## 2025-05-21 RX ADMIN — ACETAMINOPHEN 1000 MG: 10 INJECTION, SOLUTION INTRAVENOUS at 05:36

## 2025-05-21 RX ADMIN — ACETAMINOPHEN 650 MG: 325 TABLET ORAL at 18:00

## 2025-05-21 RX ADMIN — ACETAMINOPHEN 1000 MG: 10 INJECTION, SOLUTION INTRAVENOUS at 00:11

## 2025-05-21 RX ADMIN — PROCHLORPERAZINE EDISYLATE 5 MG: 5 INJECTION INTRAMUSCULAR; INTRAVENOUS at 10:30

## 2025-05-21 RX ADMIN — ONDANSETRON 8 MG: 2 INJECTION INTRAMUSCULAR; INTRAVENOUS at 12:05

## 2025-05-21 RX ADMIN — DIAZEPAM 2 MG: 2 TABLET ORAL at 21:55

## 2025-05-21 RX ADMIN — POLYETHYLENE GLYCOL 3350 17 G: 17 POWDER, FOR SOLUTION ORAL at 20:45

## 2025-05-21 RX ADMIN — CEFAZOLIN SODIUM 1800 MG: 2 SOLUTION INTRAVENOUS at 09:00

## 2025-05-21 RX ADMIN — DIAZEPAM 2 MG: 2 TABLET ORAL at 16:15

## 2025-05-21 RX ADMIN — SERTRALINE 50 MG: 50 TABLET, FILM COATED ORAL at 20:45

## 2025-05-21 RX ADMIN — DIAZEPAM 2 MG: 5 INJECTION, SOLUTION INTRAMUSCULAR; INTRAVENOUS at 09:00

## 2025-05-21 RX ADMIN — ONDANSETRON 8 MG: 2 INJECTION INTRAMUSCULAR; INTRAVENOUS at 05:36

## 2025-05-21 RX ADMIN — ACETAMINOPHEN 650 MG: 325 TABLET ORAL at 23:58

## 2025-05-21 RX ADMIN — KETOROLAC TROMETHAMINE 29.7 MG: 30 INJECTION, SOLUTION INTRAMUSCULAR; INTRAVENOUS at 05:36

## 2025-05-21 RX ADMIN — OXYCODONE 7.5 MG: 5 TABLET ORAL at 23:59

## 2025-05-21 RX ADMIN — NALOXONE HYDROCHLORIDE 1 MCG/KG/HR: 0.4 INJECTION, SOLUTION INTRAMUSCULAR; INTRAVENOUS; SUBCUTANEOUS at 00:11

## 2025-05-21 ASSESSMENT — PAIN - FUNCTIONAL ASSESSMENT
PAIN_FUNCTIONAL_ASSESSMENT: 0-10
PAIN_FUNCTIONAL_ASSESSMENT: UNABLE TO SELF-REPORT
PAIN_FUNCTIONAL_ASSESSMENT: 0-10
PAIN_FUNCTIONAL_ASSESSMENT: UNABLE TO SELF-REPORT
PAIN_FUNCTIONAL_ASSESSMENT: 0-10

## 2025-05-21 ASSESSMENT — PAIN SCALES - GENERAL
PAINLEVEL_OUTOF10: 6
PAINLEVEL_OUTOF10: 8
PAINLEVEL_OUTOF10: 2
PAINLEVEL_OUTOF10: 5 - MODERATE PAIN
PAINLEVEL_OUTOF10: 4
PAINLEVEL_OUTOF10: 7
PAINLEVEL_OUTOF10: 5 - MODERATE PAIN
PAINLEVEL_OUTOF10: 5 - MODERATE PAIN
PAINLEVEL_OUTOF10: 6
PAINLEVEL_OUTOF10: 6
PAINLEVEL_OUTOF10: 3
PAINLEVEL_OUTOF10: 2
PAINLEVEL_OUTOF10: 6
PAINLEVEL_OUTOF10: 5 - MODERATE PAIN

## 2025-05-21 ASSESSMENT — PAIN DESCRIPTION - DESCRIPTORS
DESCRIPTORS: TIGHTNESS
DESCRIPTORS: TIGHTNESS

## 2025-05-21 NOTE — CARE PLAN
The clinical goals for the shift include Patient will verbalize adequate pain control with intervention through 1900 on 5/21.    Patient afebrile, AVSS. PCA discontinued @ 1200 per order. Pain well-controlled with PO tylenol, oxycodone, naproxen. 2 doses of PRN valium given for c/o tightness. Tolerating increased PO diet. Midline back incision covered with foam & gauze, CDI. Hemovac in place - 195 mL of bloody drainage during shift. Neurovascular checks switch to q4hr per protocol, WDL. Ely removed @ 1400. Patient able to void independently after removal. Patient able to turn, complete exercises, IS, & ambulate with minimal assistance. IV Ancef course finished. Mom & Dad at bedside. No questions or concerns at this time.    Problem: Pain  Goal: Takes deep breaths with improved pain control throughout the shift  Outcome: Progressing  Goal: Turns in bed with improved pain control throughout the shift  Outcome: Progressing

## 2025-05-21 NOTE — PROGRESS NOTES
"Physical Therapy    Physical Therapy Evaluation & Treatment    Patient Name: Mariana Jensen  MRN: [id number]  Department: [facility]  Room: 06/06A  Today's Date: 5/21/2025   Time Calculation  Start Time: 0940  Stop Time: 1026  Time Calculation (min): 46 min    Assessment/Plan   PT Assessment  PT Assessment Results: Decreased strength, Decreased range of motion, Decreased endurance, Impaired balance, Decreased mobility, Orthopedic restrictions, Pain  Rehab Prognosis: Good  Barriers to Discharge Home: No anticipated barriers  Evaluation/Treatment Tolerance: Patient tolerated treatment well, Patient limited by fatigue, Patient limited by pain  Medical Staff Made Aware: Yes  Strengths: Support of Caregivers  Barriers to Participation: Comorbidities  End of Session Communication: Bedside nurse  Assessment Comment: Pt presents with impaired functional mobility s/p recent T5-L2 PSIF on 5/20. Pt with increased nausea this date but able to participate in session. Pt required 2HHA for sit <> stand and ambulated ~10ft x2 with 2HHA and one seated rest break. Pt would benefit from skilled PT while admitted to ensure safe home going.  End of Session Patient Position: Up in chair   IP OR SWING BED PT PLAN  Inpatient or Swing Bed: Inpatient  PT Plan  Treatment/Interventions: Neurodevelopmental intervention, Strengthening, Endurance training, Range of motion, Therapeutic exercise, Therapeutic activity, Home exercise program, Balance training, Stair training, Gait training, Transfer training, Bed mobility  PT Plan: Ongoing PT  PT Frequency: BID  PT Discharge Recommendations: No PT needed after discharge  PT Recommended Transfer Status: Assist x1      Subjective     PT Visit Info:  PT Received On: 05/21/25 (4323-3826)  General Visit Information:  General  Reason for Referral: recent surgery  Referred By: Osvaldo Li MD  Past Medical History Relevant to Rehab: Per chart, \"20 year old with AIS now s/p T5-L3 PSF with Dr. Jones " "on 5/20. Recovering appropriately.\"  Family/Caregiver Present: Yes  Caregiver Feedback: Mother, Father, adult sister arrived care home through session.  Prior to Session Communication: Bedside nurse  Patient Position Received: Bed, 4 rail up  General Comment: Pt received supine in bed, awake, alert. Pt reporting nausea but agreeable to evaluation.  Home Living:  Home Living  Type of Home: House  Lives With: Siblings, Parent(s)  Home Adaptive Equipment: None  Home Layout: Stairs to alternate level with rails  Alternate Level Stairs-Rails: Right  Alternate Level Stairs-Number of Steps: 10-15  Home Access: Stairs to enter without rails  Entrance Stairs-Rails: None  Entrance Stairs-Number of Steps: 3  Bathroom Shower/Tub: Tub/shower unit, Walk-in shower  Bathroom Toilet: Standard  Bathroom Equipment: None  Prior Level of Function:  Prior Function Per Pt/Caregiver Report  Level of [place]: Independent with ADLs and functional transfers  Homemaking Assistance: Independent  Ambulatory Assistance: Independent  Leisure: Pt plays golf for her college and enjoys spending time with friends.  Prior Function Comments: Pt reports she was IND with all ADLs and functional mobility prior to admission.  Precautions:  Precautions  UE Weight Bearing Status: Weight Bearing as Tolerated  LE Weight Bearing Status: Weight Bearing as Tolerated  Medical Precautions: Fall precautions, Spinal precautions  Post-Surgical Precautions: Spinal precautions  Precautions Comment: spinal precautions    Objective   Pain:  Pain Assessment  Pain Assessment: 0-10  0-10 (Numeric) Pain Score: 5 - Moderate pain  Pain Type: Surgical pain, Acute pain  Pain Location: Back  Pain Interventions: Ambulation/increased activity, Repositioned, Cold pack, Distraction, Rest  Response to Interventions: Resting quietly, Content/relaxed  Cognition:  Cognition  Overall Cognitive Status: Within Functional Limits  Orientation Level: Oriented X4  Attention: Within Functional " Limits  Memory: Within Funtional Limits  Problem Solving: Within Functional Limits  Safety/Judgement: Within Functional Limits    General Assessments:  Activity Tolerance  Endurance: Tolerates 10 - 20 min exercise with multiple rests    Sensation  Light Touch: No apparent deficits    Strength  Strength Comments: WFL  Strength  Strength Comments: [facility]    Coordination  Movements are Fluid and Coordinated: Yes    Postural Control  Postural Control: Within Functional Limits  Head Control: WFL  Trunk Control: WFL    Static Sitting Balance  Static Sitting-Balance Support: Feet unsupported  Static Sitting-Level of Assistance: Independent  Dynamic Sitting Balance  Dynamic Sitting-Balance Support: Feet supported, No upper extremity supported  Dynamic Sitting-Level of Assistance: Contact guard    Static Standing Balance  Static Standing-Balance Support: Bilateral upper extremity supported  Static Standing-Level of Assistance: Contact guard  Dynamic Standing Balance  Dynamic Standing-Balance Support: Bilateral upper extremity supported  Dynamic Standing-Level of Assistance: Contact guard  Functional Assessments:  Bed Mobility  Bed Mobility: Yes (supine > L log roll > L side lying > sitting EOB with CGA and VC for set up)    Transfers  Transfer: Yes (sit <> stand with 2HHA; 2HHA standing marches d/t reports of dizziness in standing; bed, toilet, chair)    Ambulation/Gait Training  Ambulation/Gait Training Performed: Yes (ambulates ~8ft to bathroom, reports increased dizziness which resolves with rest and diaphragmatic breaths; ambulated with 2HHA ~12ft to recliner)    Stairs  Stairs: No  Extremity/Trunk Assessments:  RUE   RUE : Within Functional Limits  LUE   LUE: Within Functional Limits  RLE   RLE : Within Functional Limits  LLE   LLE : Within Functional Limits  Treatments:  Bed Mobility  Bed Mobility: Yes (supine > L log roll > L side lying > sitting EOB with CGA and VC for set up)    Ambulation/Gait  Training  Ambulation/Gait Training Performed: Yes (ambulates ~8ft to bathroom, reports increased dizziness which resolves with rest and diaphragmatic breaths; ambulated with 2HHA ~12ft to recliner)  Transfers  Transfer: Yes (sit <> stand with 2HHA; 2HHA standing marches d/t reports of dizziness in standing; bed, toilet, chair)    Stairs  Stairs: No    Encounter Problems       Encounter Problems (Active)       IP PT Peds Mobility       Pt will ambulate 200ft independently without a LOB to safely navigate her home environment.        Start:  05/21/25    Expected End:  06/04/25            Pt will negotiate 10 stairs with any gait pattern and no LOB to safely enter/exit her home and bedroom.       Start:  05/21/25    Expected End:  06/04/25            Pt will tolerate 60 min BID up in chair to demonstrate improved activity tolerance.        Start:  05/21/25    Expected End:  06/04/25                   Education Documentation  Post-Op/Weight-Bearing Precautions, taught by Ken Barnes PT at 5/21/2025  1:44 PM.  Learner: Mother, Patient  Readiness: Acceptance  Method: Explanation  Response: Verbalizes Understanding    Transfers, taught by Ken Barnes PT at 5/21/2025  1:44 PM.  Learner: Mother, Patient  Readiness: Acceptance  Method: Explanation  Response: Verbalizes Understanding    Stairs, taught by Ken Barnes PT at 5/21/2025  1:44 PM.  Learner: Mother, Patient  Readiness: Acceptance  Method: Explanation  Response: Verbalizes Understanding    Gait Training, taught by Ken Barnes PT at 5/21/2025  1:44 PM.  Learner: Mother, Patient  Readiness: Acceptance  Method: Explanation  Response: Verbalizes Understanding    Education Comments  No comments found.

## 2025-05-21 NOTE — PROGRESS NOTES
Orthopaedic Surgery Progress Note    Subjective:  Resting comfortably in bed this AM. No acute issues. Pain controlled with PCA. Denies N/V. Denies SOB/chest pain. Denies N/T.    Objective:  Vitals:    05/21/25 0530   BP:    Pulse: 68   Resp: 22   Temp:    SpO2:      Physical Exam  - Constitutional: No acute distress, cooperative  - Eyes: EOM grossly intact  - Head/Neck: Trachea midline  - Respiratory/Thorax: NWOB  - Cardiovascular: RRR on peripheral palpation  - Gastrointestinal: Nondistended  - Psychological: Appropriate mood/behavior  - Skin: Warm and dry. Additional findings in musculoskeletal evaluation  - Musculoskeletal:  ---  Postoperative dressings in place without strikethrough bleeding  Drain in place holding suction, serosanginous output    L1: SILT       L2: SILT      Hip flexors 5/5 Left; 5/5 Right  L3: SILT      Knee extension 5/5 Left; 5/5 Right  L4: SILT      Tib Ant. (Dorsiflexion) 5/5 Left; 5/5 Right  L5: SILT      EHL5/5 Left; 5/5 Right  S1: SILT      Plantar flexion 5/5 Left; 5/5 Right      Results for orders placed or performed during the hospital encounter of 05/20/25 (from the past 24 hours)   POCT pregnancy, urine manually resulted   Result Value Ref Range    Preg Test, Ur Negative Negative       XR chest 1 view   Final Result      FL fluoro images no charge   Final Result      XR lumbar spine 1 view   Final Result      XR chest 1 view   Final Result          Assessment:  20 year old with AIS now s/p T5-L3 PSF with Dr. Jones on 5/20. Recovering appropriately.     Plan:  - Weight-bearing status: OOB as tolerated, AAT  - Feeding: Regular diet as tolerated, bowel regimen  - Analgesia: Per Pain Management Team   Appreciate recs  - Volume: mIV @ at  cc/hr, HLIVF when tolerating PO, monitor BMP  - OT/PT: Consulted  - Respiratory: Encourage IS, maintain O2 sat >92%  - Infection: Radha-operative Ancef for 24 hours, afebrile   - Lines: Maintain PIVx2 while inpatient  - Drains: Hemovac x1,  please document output q8h  - Ely: Yes, potential DC POD1  - Embolic ppx: SCDs only  - Transfusion: AM CBC, no current indication for transfusion  - Cardiac: No issues  - Glycemic: No issues  - C/w home medications  - Dispo pending PT, pain control    D/w Dr. Jones    This patient will be followed by the Orthopaedic Pediatric service. Please page or Epic Chat the corresponding residents below with questions or concerns.     Ortho Pediatric Service (Epic Chat Preferred)  First call: Prosper Coto MD PGY-1  First call: Rhona Henriquez MD PGY-1  Second call: Osvaldo Li PGY-2  Third call: Kriss Quiroz PGY-3    6pm-6am M-F, Holidays, and weekends page Ortho on-call @[phone] with urgent questions/concerns.     Osvaldo Li MD  Orthopaedic Surgery PGY-2  On-call pager [phone]

## 2025-05-21 NOTE — PROGRESS NOTES
"Physical Therapy    Physical Therapy Treatment    Patient Name: Mariana Jensen  MRN: [id number]  Department: [facility] 2  Room: 06/06-A  Today's Date: 5/21/2025  Time Calculation  Start Time: 1345  Stop Time: 1425  Time Calculation (min): 40 min         Assessment/Plan   PT Assessment  PT Assessment Results: Decreased strength, Decreased range of motion, Decreased endurance, Impaired balance, Decreased mobility, Orthopedic restrictions, Pain  Rehab Prognosis: Good  Barriers to Discharge Home: No anticipated barriers  Evaluation/Treatment Tolerance: Patient tolerated treatment well  Medical Staff Made Aware: Yes  Strengths: Support of Caregivers, Premorbid level of function  Barriers to Participation: Comorbidities  End of Session Communication: Bedside nurse  Assessment Comment: Pt is progressing well with therapy. Pt demonstrates improved independence with ADLs and functional mobility. Pt performs sit <> stand with close supervision and independently ambulates ~300ft without difficulty or LOB. Plan to trial stairs next session.  End of Session Patient Position: Up in chair  PT Plan  Inpatient/Swing Bed or Outpatient: Inpatient  PT Plan  Treatment/Interventions: Bed mobility, Gait training, Transfer training, Stair training  PT Plan: Ongoing PT  PT Frequency: BID  PT Discharge Recommendations: No PT needed after discharge  PT Recommended Transfer Status: Independent    PT Visit Info:  PT Received On: 05/21/25 ([id number])  Response to Previous Treatment: Patient with no complaints from previous session.     General Visit Information:   General  Reason for Referral: recent surgery  Referred By: Osvaldo Li MD  Past Medical History Relevant to Rehab: Per chart, \"20 year old with AIS now s/p T5-L3 PSF with Dr. Jones on 5/20. Recovering appropriately.\"  Family/Caregiver Present: Yes  Caregiver Feedback: Mother, Father, adult sister arrived USP through session.  Prior to Session Communication: Bedside " nurse  Patient Position Received: Bed, 4 rail up  General Comment: Pt received in light sleep, supine in bed, with Mom at bedside couch. Pt easily wakes to sound of voices and is motivated to go for walk. Pt reports she feels a lot better than this morning    Subjective   Precautions:  Precautions  UE Weight Bearing Status: Weight Bearing as Tolerated  LE Weight Bearing Status: Weight Bearing as Tolerated  Medical Precautions: Fall precautions, Spinal precautions  Post-Surgical Precautions: Spinal precautions  Precautions Comment: spinal precautions        Objective   Pain:  Pain Assessment  Pain Assessment: 0-10  0-10 (Numeric) Pain Score: 5 - Moderate pain  Pain Type: Surgical pain  Pain Location: Back  Pain Interventions: Ambulation/increased activity, Repositioned, Rest  Response to Interventions: Content/relaxed, Resting quietly  Cognition:  Cognition  Overall Cognitive Status: Within Functional Limits  Orientation Level: Oriented X4  Attention: Within Functional Limits  Memory: Within Funtional Limits  Problem Solving: Within Functional Limits  Safety/Judgement: Within Functional Limits  Coordination:  Movements are Fluid and Coordinated: Yes  Postural Control:  Postural Control  Postural Control: Within Functional Limits  Head Control: WFL  Trunk Control: WFL  Static Sitting Balance  Static Sitting-Balance Support: Feet unsupported  Static Sitting-Level of Assistance: Independent  Dynamic Sitting Balance  Dynamic Sitting-Balance Support: Feet supported, No upper extremity supported  Dynamic Sitting-Level of Assistance: Contact guard  Static Standing Balance  Static Standing-Balance Support: Bilateral upper extremity supported  Static Standing-Level of Assistance: Contact guard  Dynamic Standing Balance  Dynamic Standing-Balance Support: Bilateral upper extremity supported  Dynamic Standing-Level of Assistance: Contact guard    Activity Tolerance:  Activity Tolerance  Endurance: Tolerates 30 min exercise with  multiple rests  Treatments:  Therapeutic Exercise  Therapeutic Exercise Performed: Yes  Therapeutic Exercise Activity 1: supine > sit with SBA; during transition pt's martinez catherer became disloged, RN notified. RN present and removed martinez.  Therapeutic Exercise Activity 2: sit > stand with SBA  Therapeutic Exercise Activity 3: ambulates to bathroom, IND sit on toilet; unable to urinate at this time  Therapeutic Exercise Activity 4: independently completes sandra hygiene in standing without LOB  Therapeutic Exercise Activity 5: stand > sit EOB with SBA  Therapeutic Exercise Activity 6: pt independently doffs gown and dons button up shirt without difficulty  Therapeutic Exercise Activity 7: pt dons underware and pants while seated EOB via figure-4 method of donning pants  Therapeutic Exercise Activity 8: sit > stand IND  Therapeutic Exercise Activity 9: ambulates 300ft without difficulty or LOB  Therapeutic Exercise Activity 10: returns to room and independently sits on recliner  Therapeutic Exercise Activity 11: all needs met, call light within reach, RN notified.      Education Documentation  Post-Op/Weight-Bearing Precautions, taught by Ken Barnes PT at 5/21/2025  1:44 PM.  Learner: Mother, Patient  Readiness: Acceptance  Method: Explanation  Response: Verbalizes Understanding    Transfers, taught by Ken Barnes PT at 5/21/2025  1:44 PM.  Learner: Mother, Patient  Readiness: Acceptance  Method: Explanation  Response: Verbalizes Understanding    Stairs, taught by Ken Barnes PT at 5/21/2025  1:44 PM.  Learner: Mother, Patient  Readiness: Acceptance  Method: Explanation  Response: Verbalizes Understanding    Gait Training, taught by Ken Barnes PT at 5/21/2025  1:44 PM.  Learner: Mother, Patient  Readiness: Acceptance  Method: Explanation  Response: Verbalizes Understanding    Education Comments  No comments found.        Encounter Problems       Encounter Problems (Active)       IP PT Peds Mobility       Pt will  ambulate 200ft independently without a LOB to safely navigate her home environment.  (Progressing)       Start:  05/21/25    Expected End:  06/04/25            Pt will negotiate 10 stairs with any gait pattern and no LOB to safely enter/exit her home and bedroom. (Progressing)       Start:  05/21/25    Expected End:  06/04/25            Pt will tolerate 60 min BID up in chair to demonstrate improved activity tolerance.  (Progressing)       Start:  05/21/25    Expected End:  06/04/25

## 2025-05-21 NOTE — PROGRESS NOTES
"Daily Note  Inpatient consult to Pediatric Pain Management  Consult performed by: Abi Wyatt, ARNOLD-CNP  Consult ordered by: Carmen Jones MD  Reason for consult: post op pain management         Reviewed the following notes: Pediatric Orthopedics    Subjective  Pt awake and calm in chair. Recently got up for first time with PT. Having some nausea. Pain well controlled with PCA. Tolerating clears. Agrees with plan to transition to oral pain regimen.   Received 3 demands since PCA initiated.     Objective  Last Recorded Vitals  Blood pressure 95/53, pulse 74, temperature 37 °C (98.6 °F), temperature source Temporal, resp. rate 20, height 1.575 m (5' 2.01\"), weight 59.6 kg (131 lb 6.3 oz), last menstrual period 05/01/2025, SpO2 98%.    Pain Assessment  0-10 (Numeric) Pain Score: 6    I/O Totals 24 Hours  Intake  P.O. (mL): 480 mL (water, juice) (5/21/2025 12:05 PM)  I.V. (mL): 15.43 mL (5/21/2025  3:15 AM)        Physical   Constitutional: Awake and alert, appears to be comfortable at the time of assessment  Skin: No s/sx of pruritis  Eyes: Sclera clear  Resp: Patient is on RA, no work of breathing, easy unlabored respirations  Card: Pink, warm and well perfused  Gastrointestinal: Patient tolerating PO  Genitourinary: Positive urine output  Musculoskeletal: Getting up out of bed with Physical Therapy  Neurological: Appropriate for age Alert  Psychological: Mother and father at bedside, involved in care and appropriate. Updated in plan of care as related to pain regimen.    Relevant Results  Scheduled medications  Scheduled Medications[1]  Continuous medications  Continuous Medications[2]  PRN medications  PRN Medications[3]  No results found for this or any previous visit (from the past 24 hours).        Assessment and Plan  Mariana Jensen is a 20 y.o. female with a history of  Adolescent Idiopathic Scoliosis and Anxiety, Migraines, Anemia. Pt s/p PSF.  Pediatric pain service consulted to help optimize " overall pain level. PT doing well in regards to pain management.      Plan  Discontinue Dilaudid PCA, transition to oral pain regimen   Tylenol PO Q6 and Naproxen BID  Valium PO Q6 PRN muscle spasms/anxiety   Zofran IV Q6, Miralax BID for side effect management     Would recommend the following home going medications  - Tylenol 650 mg Q6hr PRN pain   - Oxycodone 7.5 mg Q4-6hr PRN pain  - Naproxen 375 mg Q12hr PRN pain  - Valium 2 mg Q6hr PRN muscle spasms  - Miralax 17g (1 capful) BID PRN to prevent constipation while taking Oxycodone     Follow pain scores per policy  Will sign off if pt does well with transition to oral pain regimen, please page with questions or concerns ([id number])             [1] acetaminophen, 650 mg, oral, q6h  [START ON 5/22/2025] bisacodyl, 5 mg, oral, BID  ceFAZolin, 30 mg/kg (Dosing Weight), intravenous, q8h  naproxen, 375 mg, oral, q12h NUZHAT  ondansetron, 8 mg, intravenous, q6h NUZHAT  oxyCODONE, 7.5 mg, oral, q6h NUZHAT  polyethylene glycol, 0.35 g/kg (Dosing Weight), oral, BID  sertraline, 50 mg, oral, Nightly  [2]    [3] PRN medications: diazePAM, HYDROmorphone, naloxone, oxyCODONE, oxygen, prochlorperazine, scopolamine

## 2025-05-22 PROCEDURE — 2500000002 HC RX 250 W HCPCS SELF ADMINISTERED DRUGS (ALT 637 FOR MEDICARE OP, ALT 636 FOR OP/ED)

## 2025-05-22 PROCEDURE — 97110 THERAPEUTIC EXERCISES: CPT | Mod: GP

## 2025-05-22 PROCEDURE — 2500000004 HC RX 250 GENERAL PHARMACY W/ HCPCS (ALT 636 FOR OP/ED): Performed by: NURSE PRACTITIONER

## 2025-05-22 PROCEDURE — 1130000001 HC PRIVATE PED ROOM DAILY

## 2025-05-22 PROCEDURE — 97535 SELF CARE MNGMENT TRAINING: CPT | Mod: GO

## 2025-05-22 PROCEDURE — 2500000004 HC RX 250 GENERAL PHARMACY W/ HCPCS (ALT 636 FOR OP/ED)

## 2025-05-22 PROCEDURE — 2500000001 HC RX 250 WO HCPCS SELF ADMINISTERED DRUGS (ALT 637 FOR MEDICARE OP): Performed by: NURSE PRACTITIONER

## 2025-05-22 PROCEDURE — 97165 OT EVAL LOW COMPLEX 30 MIN: CPT | Mod: GO

## 2025-05-22 PROCEDURE — 2500000001 HC RX 250 WO HCPCS SELF ADMINISTERED DRUGS (ALT 637 FOR MEDICARE OP)

## 2025-05-22 PROCEDURE — 2500000004 HC RX 250 GENERAL PHARMACY W/ HCPCS (ALT 636 FOR OP/ED): Mod: JZ

## 2025-05-22 RX ORDER — NAPROXEN 375 MG/1
375 TABLET ORAL
Qty: 60 TABLET | Refills: 0 | Status: SHIPPED | OUTPATIENT
Start: 2025-05-22 | End: 2025-05-23

## 2025-05-22 RX ORDER — ACETAMINOPHEN 325 MG/1
650 TABLET ORAL EVERY 6 HOURS PRN
Qty: 30 TABLET | Refills: 0 | Status: SHIPPED | OUTPATIENT
Start: 2025-05-22 | End: 2025-05-23

## 2025-05-22 RX ORDER — ONDANSETRON 4 MG/1
8 TABLET, FILM COATED ORAL EVERY 8 HOURS PRN
Status: DISCONTINUED | OUTPATIENT
Start: 2025-05-22 | End: 2025-05-23 | Stop reason: HOSPADM

## 2025-05-22 RX ORDER — DIAZEPAM 2 MG/1
2 TABLET ORAL NIGHTLY PRN
Qty: 30 TABLET | Refills: 0 | Status: SHIPPED | OUTPATIENT
Start: 2025-05-22 | End: 2025-05-23

## 2025-05-22 RX ORDER — POLYETHYLENE GLYCOL 3350 17 G/17G
17 POWDER, FOR SOLUTION ORAL 2 TIMES DAILY PRN
Qty: 510 G | Refills: 0 | Status: SHIPPED | OUTPATIENT
Start: 2025-05-22 | End: 2025-05-23

## 2025-05-22 RX ORDER — OXYCODONE HYDROCHLORIDE 5 MG/1
7.5 TABLET ORAL EVERY 6 HOURS PRN
Qty: 35 TABLET | Refills: 0 | Status: SHIPPED | OUTPATIENT
Start: 2025-05-22 | End: 2025-05-23

## 2025-05-22 RX ADMIN — SERTRALINE 50 MG: 50 TABLET, FILM COATED ORAL at 21:12

## 2025-05-22 RX ADMIN — DIAZEPAM 2 MG: 2 TABLET ORAL at 18:21

## 2025-05-22 RX ADMIN — PROCHLORPERAZINE EDISYLATE 5 MG: 5 INJECTION INTRAMUSCULAR; INTRAVENOUS at 04:11

## 2025-05-22 RX ADMIN — DIAZEPAM 2 MG: 2 TABLET ORAL at 11:55

## 2025-05-22 RX ADMIN — NAPROXEN 375 MG: 250 TABLET ORAL at 21:12

## 2025-05-22 RX ADMIN — ACETAMINOPHEN 650 MG: 325 TABLET ORAL at 17:50

## 2025-05-22 RX ADMIN — ACETAMINOPHEN 650 MG: 325 TABLET ORAL at 05:52

## 2025-05-22 RX ADMIN — ACETAMINOPHEN 650 MG: 325 TABLET ORAL at 11:40

## 2025-05-22 RX ADMIN — BISACODYL 5 MG: 5 TABLET, COATED ORAL at 08:32

## 2025-05-22 RX ADMIN — ONDANSETRON 8 MG: 2 INJECTION INTRAMUSCULAR; INTRAVENOUS at 05:53

## 2025-05-22 RX ADMIN — POLYETHYLENE GLYCOL 3350 17 G: 17 POWDER, FOR SOLUTION ORAL at 21:12

## 2025-05-22 RX ADMIN — OXYCODONE 7.5 MG: 5 TABLET ORAL at 17:50

## 2025-05-22 RX ADMIN — POLYETHYLENE GLYCOL 3350 17 G: 17 POWDER, FOR SOLUTION ORAL at 08:32

## 2025-05-22 RX ADMIN — OXYCODONE 7.5 MG: 5 TABLET ORAL at 23:30

## 2025-05-22 RX ADMIN — OXYCODONE 7.5 MG: 5 TABLET ORAL at 11:40

## 2025-05-22 RX ADMIN — NAPROXEN 375 MG: 250 TABLET ORAL at 08:32

## 2025-05-22 RX ADMIN — DIAZEPAM 2 MG: 2 TABLET ORAL at 04:12

## 2025-05-22 RX ADMIN — OXYCODONE 7.5 MG: 5 TABLET ORAL at 05:52

## 2025-05-22 RX ADMIN — ACETAMINOPHEN 650 MG: 325 TABLET ORAL at 23:30

## 2025-05-22 ASSESSMENT — PAIN - FUNCTIONAL ASSESSMENT
PAIN_FUNCTIONAL_ASSESSMENT: 0-10

## 2025-05-22 ASSESSMENT — PAIN DESCRIPTION - DESCRIPTORS
DESCRIPTORS: ACHING
DESCRIPTORS: ACHING;SPASM
DESCRIPTORS: TIGHTNESS
DESCRIPTORS: ACHING;SPASM

## 2025-05-22 ASSESSMENT — ACTIVITIES OF DAILY LIVING (ADL)
HOME_MANAGEMENT_TIME_ENTRY: 14
ADL_ASSISTANCE: INDEPENDENT

## 2025-05-22 ASSESSMENT — PAIN SCALES - GENERAL
PAINLEVEL_OUTOF10: 6
PAINLEVEL_OUTOF10: 5 - MODERATE PAIN
PAINLEVEL_OUTOF10: 6
PAINLEVEL_OUTOF10: 7
PAINLEVEL_OUTOF10: 4
PAINLEVEL_OUTOF10: 6
PAINLEVEL_OUTOF10: 6
PAINLEVEL_OUTOF10: 4
PAINLEVEL_OUTOF10: 6
PAINLEVEL_OUTOF10: 7

## 2025-05-22 NOTE — PROGRESS NOTES
"Occupational Therapy    Evaluation    Patient Name: Mariana Jensen  MRN: [id number]  Department: [facility] 2  Room: 06/06-A  Today's Date: 5/22/2025  Time Calculation  Start Time: 0904  Stop Time: 0933  Time Calculation (min): 29 min        Assessment:  OT Assessment: Pt presents with decreased ability to perform ADLs with baseline independence, pt educated on performing ADL tasks within spinal precautions and demonstrates ability to perform needed ADLs with SUP, minimal assistance required to don L sock. Pt reports she will have assistance from parents at home as needed during recovery.  Prognosis: Good  Barriers to Discharge Home: No anticipated barriers  Evaluation/Treatment Tolerance: Patient tolerated treatment well  Medical Staff Made Aware: Yes  End of Session Communication: Bedside nurse  End of Session Patient Position: Up in chair  OT Assessment Results: Decreased ADL status  Prognosis: Good  Barriers to Discharge: None  Evaluation/Treatment Tolerance: Patient tolerated treatment well  Medical Staff Made Aware: Yes  Strengths: Support of Caregivers  Barriers to Participation: Comorbidities  Plan:  No Skilled OT: No acute OT goals identified  OT Frequency: OT eval only  OT Discharge Recommendations: No further acute OT  Equipment Recommended upon Discharge:  (Shower chair (if needed))       Subjective   Current Problem:  1. Adolescent idiopathic scoliosis          OT Visit Info:  OT Received On: 05/22/25  General:  General  Reason for Referral: Recent Surgery  Referred By: Osvaldo Li MD  Past Medical History Relevant to Rehab: Per chart, \"20 year old with AIS now s/p T5-L3 PSF with Dr. Jones on 5/20. Recovering appropriately.\"  Family/Caregiver Present: No  Caregiver Feedback: No CG present during session  Co-Treatment: PT  Co-Treatment Reason: Skilled mobilization of medically complex pt  Prior to Session Communication: Bedside nurse  Patient Position Received: Up in chair  Preferred Learning " Style: verbal  General Comment: Pt greeted sitting up in chair, agreeable to OT/PT session at this time.  Precautions:  UE Weight Bearing Status: Weight Bearing as Tolerated  LE Weight Bearing Status: Weight Bearing as Tolerated  Medical Precautions: Fall precautions, Spinal precautions  Post-Surgical Precautions: Spinal precautions  Precautions Comment: spinal precautions     Date/Time Vitals Session Patient Position Pulse Resp SpO2 BP MAP (mmHg)    05/22/25 1140 --  --  84  20  97 %  106/65  77             Pain:  Pain Assessment  Pain Assessment: 0-10  0-10 (Numeric) Pain Score: 6  Pain Type: Acute pain  Pain Location: Back  Pain Interventions: Repositioned, Ambulation/increased activity  Response to Interventions: Decrease in pain (4/10 at end of session)    Objective   Cognition:  Overall Cognitive Status: Within Functional Limits  Orientation Level: Oriented X4  Attention: Within Functional Limits  Memory: Within Funtional Limits  Problem Solving: Within Functional Limits  Insight: Within function limits  Impulsive: Within functional limits     Home Living:  Type of Home: House  Lives With: Siblings, Parent(s)  Home Adaptive Equipment: None  Home Layout: Stairs to alternate level with rails  Alternate Level Stairs-Rails: Right  Alternate Level Stairs-Number of Steps: 10-15  Home Access: Stairs to enter without rails  Entrance Stairs-Rails: None  Entrance Stairs-Number of Steps: 3  Bathroom Shower/Tub: Tub/shower unit, Walk-in shower  Bathroom Toilet: Standard  Bathroom Equipment: None  Prior Function:  Level of [place]: Independent with ADLs and functional transfers  ADL Assistance: Independent  Homemaking Assistance: Independent  Ambulatory Assistance: Independent  Leisure: Pt plays golf for her college and enjoys spending time with friends and cats  Prior Function Comments: Pt reports she was IND with all ADLs and functional mobility prior to admission.  IADL History:  Homemaking Responsibilities:  Yes  IADL Comments: Pt lived independently at [facility] prior to surgery, was IND with homemaking for herself  ADL:  ADL Comments: Pt able to doff/don button-up pajama top seated EOB, pt stands with close SUP to draw shorts/underwear over hips, sits to step BLE out of clothing. Pt educated on dressing more difficult LE first (LLE) during LB dressing, pt able to thread BLE into underwear/shorts while seated, lifting BLEs to access feet, stands with close SUP to hike over hips. Pt able to don R sock with verbal cueing, Min A to don L sock due to limited lift in LLE. Pt then completes oral hygiene/grooming in standing at sink level with close SUP.  Activity Tolerance:  Endurance: Tolerates 10 - 20 min exercise with multiple rests  Bed Mobility/Transfers:   Transfers  Transfer: Yes (Sit-stand with close SUP)    Functional Mobility:  Functional Mobility  Functional Mobility Performed: Yes  Functional Mobility 1  Surface 1: Level tile  Device 1: No device  Functional Mobility Support Devices:  (N/A)  Assistance 1: Close supervision  Quality of Functional Mobility 1: Narrow base of support  Comments 1: Pt ambulates in hallway ~200' from room to/from stairwell with close SUP  Functional Mobility 2  Surface 2:  (Stairs)  Device 2:  (Hand rail)  Functional Mobility Support Devices:  (N/A)  Assistance 2: Close supervision  Comments 2: Pt navigates 12 stairs up/down with hand rails as needed and close SUP     Sensation:  Light Touch: No apparent deficits  Sharp/Dull: No apparent deficits  Stereognosis: No apparent deficits  Proprioception: No apparent deficits  Strength:  Strength Comments: WFL, within spinal precautions  Perception:  Inattention/Neglect: Appears intact  Initiation: Appears intact  Motor Planning: Appears intact  Perseveration: Not present  Coordination:  Movements are Fluid and Coordinated: Yes  Finger to Nose: Intact   Hand Function:  Gross Grasp: Functional  Coordination: Functional  Extremities:   RUE   RUE :  Within Functional Limits  LUE   LUE: Within Functional Limits  RLE   RLE : Within Functional Limits  LLE   LLE : Within Functional Limits    EDUCATION:  Education  Individual(s) Educated: Patient  Risk and Benefits Discussed with Patient/Caregiver/Other: yes  Patient/Caregiver Demonstrated Understanding: yes  Plan of Care Discussed and Agreed Upon: yes  Patient Response to Education: Patient/Caregiver Verbalized Understanding of Information, Patient/Caregiver Performed Return Demonstration of Exercises/Activities  Education Comment: Pt educated on techniques for LB dressing and toileting hygiene within spinal precautions, pt verbalizes understanding and demonstrates understanding of LB dressing education. Pt reports she has a shower bench installed in a walk-in shower at home, educated on purchase of shower chair if installed bench does not work in WIS

## 2025-05-22 NOTE — PROGRESS NOTES
"Physical Therapy    Physical Therapy Treatment    Patient Name: Mariana Jensen  MRN: [id number]  Department: [facility] 2  Room: 06/06A  Today's Date: 5/22/2025  Time Calculation  Start Time: 0907  Stop Time: 0923  Time Calculation (min): 16 min         Assessment/Plan   PT Assessment  PT Assessment Results: Decreased strength, Decreased range of motion, Decreased endurance, Impaired balance, Decreased mobility, Orthopedic restrictions, Pain  Rehab Prognosis: Good  Barriers to Discharge Home: No anticipated barriers  Evaluation/Treatment Tolerance: Patient tolerated treatment well  Medical Staff Made Aware: Yes  Strengths: Support of Caregivers, Premorbid level of function  Barriers to Participation: Comorbidities  End of Session Communication: Bedside nurse  Assessment Comment: Pt is progressing well. Pt negotiated 10 steps without difficulty or LOB. Pt is cleared for safe home going from a PT perspective.  End of Session Patient Position: Up in chair  PT Plan  Inpatient/Swing Bed or Outpatient: Inpatient  PT Plan  Treatment/Interventions: Bed mobility, Gait training, Transfer training, Stair training  PT Plan: Other needs (Comment) (cleared for DC)  PT Frequency: Other (Comment) (cleared for DC)  PT Discharge Recommendations: No PT needed after discharge  Equipment Recommended upon Discharge:  (Shower chair (if needed))  PT Recommended Transfer Status: Independent  PT - OK to Discharge: Yes    PT Visit Info:  PT Received On: 05/22/25 (0863-8080)  Response to Previous Treatment: Patient with no complaints from previous session.     General Visit Information:   General  Reason for Referral: Recent Surgery  Referred By: Osvaldo Li MD  Past Medical History Relevant to Rehab: Per chart, \"20 year old with AIS now s/p T5-L3 PSF with Dr. Jones on 5/20. Recovering appropriately.\"  Family/Caregiver Present: No  Caregiver Feedback: No CG present during session  Co-Treatment: OT  Co-Treatment Reason: Skilled " mobilization of medically complex pt  Prior to Session Communication: Bedside nurse  Patient Position Received: Up in chair  Preferred Learning Style: verbal  General Comment: Pt greeted sitting up in chair, agreeable to OT/PT session at this time.    Subjective   Precautions:  Precautions  UE Weight Bearing Status: Weight Bearing as Tolerated  LE Weight Bearing Status: Weight Bearing as Tolerated  Medical Precautions: Fall precautions, Spinal precautions  Post-Surgical Precautions: Spinal precautions  Precautions Comment: spinal precautions      Objective   Pain:  Pain Assessment  Pain Assessment: 0-10  0-10 (Numeric) Pain Score: 6  Pain Type: Acute pain  Pain Location: Back  Pain Interventions: Ambulation/increased activity, Repositioned  Response to Interventions: Content/relaxed, Resting quietly  Cognition:  Cognition  Overall Cognitive Status: Within Functional Limits  Orientation Level: Oriented X4  Attention: Within Functional Limits  Memory: Within Funtional Limits  Problem Solving: Within Functional Limits  Coordination:  Movements are Fluid and Coordinated: Yes  Finger to Nose: Intact  Extremity/Trunk Assessments:  RUE   RUE : Within Functional Limits  LUE   LUE: Within Functional Limits  RLE   RLE : Within Functional Limits  LLE   LLE : Within Functional Limits  Activity Tolerance:  Activity Tolerance  Endurance: Tolerates 10 - 20 min exercise with multiple rests  Treatments:  Therapeutic Exercise  Therapeutic Exercise Performed: Yes  Therapeutic Exercise Activity 1: sit > stand with distant supervision  Therapeutic Exercise Activity 2: ambulates ~100ft to stairwell without difficulty, no LOB, gait grossly intact  Therapeutic Exercise Activity 3: negotiates 10 steps with step-to pattern and no LOB.  Therapeutic Exercise Activity 5: pt left seated EOB with OT at end of session      Encounter Problems       Encounter Problems (Active)       IP PT Peds Mobility       Pt will ambulate 200ft independently  without a LOB to safely navigate her home environment.  (Progressing)       Start:  05/21/25    Expected End:  06/04/25            Pt will negotiate 10 stairs with any gait pattern and no LOB to safely enter/exit her home and bedroom. (Progressing)       Start:  05/21/25    Expected End:  06/04/25            Pt will tolerate 60 min BID up in chair to demonstrate improved activity tolerance.  (Progressing)       Start:  05/21/25    Expected End:  06/04/25

## 2025-05-22 NOTE — PROGRESS NOTES
Orthopaedic Surgery Progress Note    Subjective:  Resting comfortably in bed this AM. Worked well with PT yesterday. Pain has been controlled with oral pain medication.     Objective:  Vitals:    05/22/25 0417   BP: 108/67   Pulse: 85   Resp: 18   Temp: 36.5 °C (97.7 °F)   SpO2: 99%     Physical Exam  - Constitutional: No acute distress, cooperative  - Eyes: EOM grossly intact  - Head/Neck: Trachea midline  - Respiratory/Thorax: NWOB  - Cardiovascular: RRR on peripheral palpation  - Gastrointestinal: Nondistended  - Psychological: Appropriate mood/behavior  - Skin: Warm and dry. Additional findings in musculoskeletal evaluation  - Musculoskeletal:  ---  Postoperative dressings in place without strikethrough bleeding  Drain in place holding suction, serosanginous output    L1: SILT       L2: SILT      Hip flexors 5/5 Left; 5/5 Right  L3: SILT      Knee extension 5/5 Left; 5/5 Right  L4: SILT      Tib Ant. (Dorsiflexion) 5/5 Left; 5/5 Right  L5: SILT      EHL5/5 Left; 5/5 Right  S1: SILT      Plantar flexion 5/5 Left; 5/5 Right      No results found for this or any previous visit (from the past 24 hours).      XR chest 1 view   Final Result      FL fluoro images no charge   Final Result      XR lumbar spine 1 view   Final Result      XR chest 1 view   Final Result          Assessment:  20 year old with AIS now s/p T5-L3 PSF with Dr. Jones on 5/20. Recovering appropriately.     Plan:  - Weight-bearing status: OOB as tolerated, AAT  - Feeding: Regular diet as tolerated, bowel regimen  - Analgesia: Per Pain Management Team   Appreciate recs  - Volume: mIV @ at  cc/hr, HLIVF when tolerating PO, monitor BMP  - OT/PT: Consulted  - Respiratory: Encourage IS, maintain O2 sat >92%  - Infection: Radha-operative Ancef for 24 hours, afebrile   - Lines: Maintain PIVx2 while inpatient  - Drains: Hemovac x1, please document output q8h  - [place]: Pulled POD 1 (5/21)  - Embolic ppx: SCDs only  - Transfusion: AM CBC, no current  indication for transfusion  - Cardiac: No issues  - Glycemic: No issues  - C/w home medications  - Dispo pending PT, pain control    D/w Dr. Jones    This patient will be followed by the Orthopaedic Pediatric service. Please page or Epic Chat the corresponding residents below with questions or concerns.     Ortho Pediatric Service (Epic Chat Preferred)  First call: Prosper Coto MD PGY-1  First call: Rhona Henriquez MD PGY-1  Second call: Osvaldo Li, PGY-2  Third call: Kriss Quiroz PGY-3    6pm-6am M-F, Holidays, and weekends page Ortho on-call @[phone] with urgent questions/concerns.     Osvaldo Li MD  Orthopaedic Surgery PGY-2  On-call pager [phone]

## 2025-05-23 ENCOUNTER — PHARMACY VISIT (OUTPATIENT)
Dept: PHARMACY | Facility: CLINIC | Age: 20
End: 2025-05-23
Payer: COMMERCIAL

## 2025-05-23 VITALS
RESPIRATION RATE: 16 BRPM | WEIGHT: 131.39 LBS | HEART RATE: 76 BPM | SYSTOLIC BLOOD PRESSURE: 123 MMHG | DIASTOLIC BLOOD PRESSURE: 79 MMHG | BODY MASS INDEX: 24.18 KG/M2 | TEMPERATURE: 97.7 F | OXYGEN SATURATION: 99 % | HEIGHT: 62 IN

## 2025-05-23 PROCEDURE — 2500000001 HC RX 250 WO HCPCS SELF ADMINISTERED DRUGS (ALT 637 FOR MEDICARE OP): Performed by: NURSE PRACTITIONER

## 2025-05-23 PROCEDURE — 2500000004 HC RX 250 GENERAL PHARMACY W/ HCPCS (ALT 636 FOR OP/ED)

## 2025-05-23 PROCEDURE — 2500000004 HC RX 250 GENERAL PHARMACY W/ HCPCS (ALT 636 FOR OP/ED): Performed by: NURSE PRACTITIONER

## 2025-05-23 PROCEDURE — RXMED WILLOW AMBULATORY MEDICATION CHARGE

## 2025-05-23 RX ORDER — DIAZEPAM 2 MG/1
2 TABLET ORAL NIGHTLY PRN
Qty: 30 TABLET | Refills: 0 | Status: SHIPPED | OUTPATIENT
Start: 2025-05-23 | End: 2025-05-23

## 2025-05-23 RX ORDER — NAPROXEN 375 MG/1
375 TABLET ORAL
Qty: 60 TABLET | Refills: 0 | Status: SHIPPED | OUTPATIENT
Start: 2025-05-23 | End: 2025-05-23

## 2025-05-23 RX ORDER — ACETAMINOPHEN 325 MG/1
650 TABLET ORAL EVERY 6 HOURS PRN
Qty: 30 TABLET | Refills: 0 | Status: SHIPPED | OUTPATIENT
Start: 2025-05-23 | End: 2025-05-28 | Stop reason: SDUPTHER

## 2025-05-23 RX ORDER — ACETAMINOPHEN 325 MG/1
650 TABLET ORAL EVERY 6 HOURS PRN
Qty: 30 TABLET | Refills: 0 | Status: SHIPPED | OUTPATIENT
Start: 2025-05-23 | End: 2025-05-23

## 2025-05-23 RX ORDER — OXYCODONE HYDROCHLORIDE 5 MG/1
7.5 TABLET ORAL EVERY 6 HOURS PRN
Qty: 35 TABLET | Refills: 0 | Status: SHIPPED | OUTPATIENT
Start: 2025-05-23 | End: 2025-05-23

## 2025-05-23 RX ORDER — NAPROXEN 375 MG/1
375 TABLET ORAL
Qty: 60 TABLET | Refills: 0 | Status: SHIPPED | OUTPATIENT
Start: 2025-05-23 | End: 2025-06-22

## 2025-05-23 RX ORDER — POLYETHYLENE GLYCOL 3350 17 G/17G
17 POWDER, FOR SOLUTION ORAL 2 TIMES DAILY PRN
Qty: 510 G | Refills: 0 | Status: SHIPPED | OUTPATIENT
Start: 2025-05-23

## 2025-05-23 RX ORDER — OXYCODONE HYDROCHLORIDE 5 MG/1
7.5 TABLET ORAL EVERY 6 HOURS PRN
Qty: 35 TABLET | Refills: 0 | Status: SHIPPED | OUTPATIENT
Start: 2025-05-23

## 2025-05-23 RX ORDER — DIAZEPAM 2 MG/1
2 TABLET ORAL NIGHTLY PRN
Qty: 30 TABLET | Refills: 0 | Status: SHIPPED | OUTPATIENT
Start: 2025-05-23

## 2025-05-23 RX ORDER — POLYETHYLENE GLYCOL 3350 17 G/17G
17 POWDER, FOR SOLUTION ORAL 2 TIMES DAILY PRN
Qty: 510 G | Refills: 0 | Status: SHIPPED | OUTPATIENT
Start: 2025-05-23 | End: 2025-05-23

## 2025-05-23 RX ORDER — ONDANSETRON 4 MG/1
8 TABLET, FILM COATED ORAL EVERY 12 HOURS PRN
Qty: 14 TABLET | Refills: 0 | Status: SHIPPED | OUTPATIENT
Start: 2025-05-23 | End: 2025-05-30

## 2025-05-23 RX ADMIN — OXYCODONE 7.5 MG: 5 TABLET ORAL at 05:51

## 2025-05-23 RX ADMIN — ONDANSETRON HYDROCHLORIDE 8 MG: 4 TABLET, FILM COATED ORAL at 03:25

## 2025-05-23 RX ADMIN — NAPROXEN 375 MG: 250 TABLET ORAL at 08:55

## 2025-05-23 RX ADMIN — ACETAMINOPHEN 650 MG: 325 TABLET ORAL at 11:46

## 2025-05-23 RX ADMIN — OXYCODONE 7.5 MG: 5 TABLET ORAL at 11:46

## 2025-05-23 RX ADMIN — ACETAMINOPHEN 650 MG: 325 TABLET ORAL at 05:51

## 2025-05-23 RX ADMIN — POLYETHYLENE GLYCOL 3350 17 G: 17 POWDER, FOR SOLUTION ORAL at 08:55

## 2025-05-23 RX ADMIN — DIAZEPAM 2 MG: 2 TABLET ORAL at 08:55

## 2025-05-23 ASSESSMENT — PAIN SCALES - GENERAL
PAINLEVEL_OUTOF10: 3
PAINLEVEL_OUTOF10: 2
PAINLEVEL_OUTOF10: 3
PAINLEVEL_OUTOF10: 2
PAINLEVEL_OUTOF10: 2

## 2025-05-23 ASSESSMENT — PAIN - FUNCTIONAL ASSESSMENT
PAIN_FUNCTIONAL_ASSESSMENT: 0-10

## 2025-05-23 NOTE — PROGRESS NOTES
Orthopaedic Surgery Progress Note    Subjective:  Resting comfortably in bed this AM. Worked well with PT yesterday. Pain has been controlled with oral pain medication.     Objective:  Vitals:    05/22/25 2336   BP: 112/56   Pulse: 79   Resp: 16   Temp: 36.8 °C (98.2 °F)   SpO2: 96%     Physical Exam  - Constitutional: No acute distress, cooperative  - Eyes: EOM grossly intact  - Head/Neck: Trachea midline  - Respiratory/Thorax: NWOB  - Cardiovascular: RRR on peripheral palpation  - Gastrointestinal: Nondistended  - Psychological: Appropriate mood/behavior  - Skin: Warm and dry. Additional findings in musculoskeletal evaluation  - Musculoskeletal:  ---  Postoperative dressings in place without strikethrough bleeding  Drain in place holding suction, serosanginous output    L1: SILT       L2: SILT      Hip flexors 5/5 Left; 5/5 Right  L3: SILT      Knee extension 5/5 Left; 5/5 Right  L4: SILT      Tib Ant. (Dorsiflexion) 5/5 Left; 5/5 Right  L5: SILT      EHL5/5 Left; 5/5 Right  S1: SILT      Plantar flexion 5/5 Left; 5/5 Right      No results found for this or any previous visit (from the past 24 hours).      XR chest 1 view   Final Result      FL fluoro images no charge   Final Result      XR lumbar spine 1 view   Final Result      XR chest 1 view   Final Result          Assessment:  20 year old with AIS now s/p T5-L3 PSF with Dr. Jones on 5/20. Recovering appropriately.     Plan:  - Weight-bearing status: OOB as tolerated, AAT  - Feeding: Regular diet as tolerated, bowel regimen  - Analgesia: Per Pain Management Team   Appreciate recs  - Volume: mIV @ at  cc/hr, HLIVF when tolerating PO, monitor BMP  - OT/PT: Consulted  - Respiratory: Encourage IS, maintain O2 sat >92%  - Infection: Radha-operative Ancef for 24 hours, afebrile   - Lines: Maintain PIVx2 while inpatient  - Drains: Hemovac x1, please document output q8h - will plan to pull today prior to dc  - Ely: Pulled POD 1 (5/21)  - Embolic ppx: SCDs  only  - Transfusion: AM CBC, no current indication for transfusion  - Cardiac: No issues  - Glycemic: No issues  - C/w home medications  - Dispo anticipate dc home today    D/w Dr. Jones    This patient will be followed by the Orthopaedic Pediatric service. Please page or Epic Chat the corresponding residents below with questions or concerns.     Ortho Pediatric Service (Epic Chat Preferred)  First call: Prosper Coto MD PGY-1  First call: Rhona Henriquez MD PGY-1  Second call: Osvaldo Li PGY-2  Third call: Kriss Quiroz PGY-3    6pm-6am M-F, Holidays, and weekends page Ortho on-call @[phone] with urgent questions/concerns.     Prosper Coto MD  Orthopaedic Surgery PGY-1  On-call pager [phone]

## 2025-05-23 NOTE — DISCHARGE INSTR - ACTIVITY
Try to be active each day.  Start with small walking goals and increase daily.  Give yourself time to rest in between.  You may walk up and down stairs.  No severe bending, twisting or jarring for 5 months.   No driving for 3 weeks and must be off of Valium and Narcotics prior to driving

## 2025-05-23 NOTE — DISCHARGE INSTR - OTHER ORDERS
May shower. No tub bath for 1 week.     Incision may be washed with soap and water. Allow soap and water to run freely over incision. Do not scrub or rub incision. Pat incision dry gently after shower. Do NOT apply any lotions, ointments, or creams to incisional area.     Leave the steri-strips(small pieces of tape) in place. They will fall off on their own.    Report any redness, warmth, swelling, or drainage from the incisional area.

## 2025-05-23 NOTE — DISCHARGE INSTR - AVS FIRST PAGE
Notify Dr Jones's office [phone]    Temperature greater than 100.7  Increase in pain, redness, swelling or drainage from the incision  Any change in bowel or bladder function  Fever/Chills  Numbness or tingling in feet or hands

## 2025-05-23 NOTE — DISCHARGE SUMMARY
Discharge Diagnosis  Scoliosis    Issues Requiring Follow-Up  Postoperative evaluation    Test Results Pending At Discharge  Pending Labs       Order Current Status    POCT pregnancy, urine manually resulted In process          Hospital Course   20 year-old F who presented with AIS. Patient is now s/p T5-L3 PSF on 5/20 by Dr. Jones. On the day of surgery, patient was identified in the pre-operative holding area and agreeable to proceed with surgery. Written consent was obtained.  Please see operative note for further details of this procedure. Patient received 24 hours of sandra-operative antibiotics. Patient recovered in the PACU before transfer to a regular nursing floor. Patient was started on multimodal pain control managed by the pediatric pain service and SCD's for DVT prophylaxis. Physical therapy recommended continued recovery at home. On the day of discharge, patient was afebrile with stable vital signs. Patient was neurovascularly intact at time of discharge. Patient will follow-up with Dr. Jones in 4 weeks for post-operative visit.     Home Medications     Medication List      START taking these medications     acetaminophen 325 mg tablet; Commonly known as: Tylenol; Take 2 tablets   (650 mg) by mouth every 6 hours if needed for mild pain (1 - 3).   diazePAM 2 mg tablet; Commonly known as: Valium; Take 1 tablet (2 mg) by   mouth as needed at bedtime for anxiety, sedation or muscle spasms.; Notes   to patient: Every 6 hours as needed   naproxen 375 mg tablet; Commonly known as: Naprosyn; Take 1 tablet (375   mg) by mouth 2 times daily (morning and late afternoon).   ondansetron 4 mg tablet; Commonly known as: Zofran; Take 2 tablets (8   mg) by mouth every 12 hours if needed for nausea or vomiting for up to 7   days.   oxyCODONE 5 mg immediate release tablet; Commonly known as: Roxicodone;   Take 1.5 tablets (7.5 mg) by mouth every 6 hours if needed for severe pain   (7 - 10).   polyethylene glycol 17  gram/dose powder; Commonly known as: Miralax; Mix   1 capful (17 g) of powder in 4-8 ounces of liquid and drink 2 times a day   as needed for constipation (While taking oxycodone).     CONTINUE taking these medications     albuterol 90 mcg/actuation inhaler; TAKE 2 PUFFS BY MOUTH EVERY 4 TO 6   HOURS AS NEEDED   ketoconazole 2 % shampoo; Commonly known as: NIZOral   sertraline 50 mg tablet; Commonly known as: Zoloft; TAKE 1 AND 1/2   TABLETS DAILY BY MOUTH   Sronyx 0.1-20 mg-mcg tablet; Generic drug: levonorgestreL-ethinyl   estrad; Take 1 tablet by mouth once daily.     STOP taking these medications     chlorhexidine 0.12 % solution; Commonly known as: Peridex   chlorhexidine 4 % external liquid; Commonly known as: Hibiclens   mupirocin 2 % ointment; Commonly known as: Bactroban       Outpatient Follow-Up  Future Appointments   Date Time Provider Department [place]   6/16/2025  2:30 PM Carmen Jones MD [id number] [facility]   8/8/2025  2:00 PM Carmen Jones MD [id number] [facility]       Prosper Coto MD

## 2025-05-23 NOTE — OP NOTE
Posterior spinal fusion with instrumentation and bone graft Operative Note     Date: 2025  OR Location: [facility]    Name: Mariana Jensen, : 2005, Age: 20 y.o., MRN: [id number], Sex: female    Diagnosis  Pre-op Diagnosis      * Adolescent idiopathic scoliosis of thoracolumbar region [M41.125] Post-op Diagnosis     * Adolescent idiopathic scoliosis of thoracolumbar region [M41.125]     Procedures  Posterior spinal fusion with instrumentation and bone graft  31796 - MD ARTHRODESIS POSTERIOR SPINAL DFRM 13+ VRT SGM    Posterior spinal fusion with instrumentation and bone graft  53137 - MD POSTERIOR SEGMENTAL INSTRUMENTATION 13/> VRT SE    Posterior spinal fusion with instrumentation and bone graft  42802 - MD AUTOGRAFT SPINE SURGERY LOCAL FROM SAME INCISION    Posterior spinal fusion with instrumentation and bone graft   - MD ALLOGRAFT FOR SPINE SURGERY ONLY MORSELIZED    MD STEREOTACTIC COMPUTER ASSISTED PX SPINAL [85508]  Surgeons      * Carmen Jones - Primary    Resident/Fellow/Other Assistant:  Surgeons and Role:     * Osvaldo Li MD - Resident - Assisting    Staff:   Larry: Bailee  Circulator: Carmen Ramires Person: Michael Lemonub Person: Terrance    Anesthesia Staff: Anesthesiologist: Stacy Perdue MD  C-AA: KAYLIN Ahmadi    Procedure Summary  Anesthesia: General  ASA: II  Estimated Blood Loss: 250 mL  Intra-op Medications:   Administrations occurring from 0730 to 1345 on 25:   Medication Name Total Dose   mupirocin (Bactroban) 2 % cream 1 Application   vancomycin (Vancocin) vial for injection 1 g   morphine PF (Duramorph) injection 0.45 mg   EPINEPHrine HCl (PF) (Adrenalin) 0.5 mg in sodium chloride 0.9% 250 mL subcutaneous injection 0.5 mg   sodium chloride 0.9 % irrigation solution 600 mL   bacitracin ointment 1 Application   ceFAZolin (Ancef) 1,800 mg in dextrose (iso) IV 45 mL 3,600 mg   diazePAM (Valium) injection 2 mg 2 mg   ketamine (Ketalar) 1,000  mg in dextrose 5% 100 mL infusion 42.57 mg   remifentanil (Ultiva) 1,000 mcg in sodium chloride 0.9% 20 mL (50 mcg/mL) infusion 1.84 mg   tranexamic acid (Cyklokapron) 5,000 mg in dextrose 5% 250 mL (20 mg/mL) infusion 2,623.6 mg   acetaminophen (Ofirmev) injection 900 mg   albumin human 5 % 250 mL   aprepitant (Emend) capsule 40 mg   dexAMETHasone (Decadron) 4 mg/mL IV Syringe 2 mL 8 mg   fentaNYL (Sublimaze) injection 50 mcg/mL 100 mcg   glycopyrrolate (Robinul) injection 0.2 mg   ketorolac (Toradol) injection 30 mg 30 mg   LR bolus Cannot be calculated   lactated Ringer's infusion Cannot be calculated   lidocaine PF (Xylocaine-MPF) local injection 2 % 80 mg   midazolam PF (Versed) injection 1 mg/mL 2 mg   morphine PF 0.5 mg/mL 0.45 mg   ondansetron (Zofran) 2 mg/mL injection 8 mg   phenylephrine 40 mcg/mL syringe 10 mL 40 mcg   propofol (Diprivan) injection 10 mg/mL 2,696.29 mg              Anesthesia Record               Intraprocedure I/O Totals          Intake    Ketamine 4.26 mL    The total shown is the total volume documented since Anesthesia Start was filed.    LR bolus 1000.00 mL    Remifentanil Drip 36.77 mL    The total shown is the total volume documented since Anesthesia Start was filed.    Tranexamic Acid 131.18 mL    The total shown is the total volume documented since Anesthesia Start was filed.    lactated Ringer's 1000.00 mL    acetaminophen 1,000 mg/100 mL (10 mg/mL) 90.00 mL    albumin human 5 % 250.00 mL    ceFAZolin (Ancef) 1,800 mg in dextrose (iso) IV 45 mL 90.00 mL    Cell Saver 85 mL    Total Intake 2687.21 mL       Output    Urine 275 mL    Est. Blood Loss 250 mL    Total Output 525 mL       Net    Net Volume 2162.21 mL          Specimen: No specimens collected              Drains and/or Catheters:   [REMOVED] Closed/Suction Drain Midline Back 15 Fr. (Removed)   Site Description Unable to view 05/23/25 0900   Dressing Status Clean;Dry;Occlusive 05/23/25 0900   Drainage Appearance Bright  red;Bloody 05/22/25 2100   Status To bulb suction 05/22/25 2100   Output (mL) 20 mL 05/23/25 0754       [REMOVED] NG/OG/Feeding Tube OG - [place] sump 16 Fr Center mouth (Removed)       [REMOVED] Urethral Catheter Straight-tip 14 Fr. (Removed)   Site Assessment Clean;Skin intact 05/21/25 1400   Collection Container Standard drainage bag 05/21/25 1400   Securement Method Securing device (Describe) 05/21/25 1400   Output (mL) 150 mL 05/21/25 1400       Tourniquet Times:         Implants:  Implants       Type Name Action Serial No.      Graft BONE CHIPS,  CANCELL 30CC 1.7-10MM - [id number] - [id number] Implanted [id number]     Graft BONE CHIPS,  CANCELL 30CC 1.7-10MM - [id number] - [id number] Implanted [id number]     Graft BONE CHIPS,  CANCELL 30CC 1.7-10MM - [id number] - [id number] Implanted [id number]     Spinal Hardware SCREW, PEDICLE 5.0 X 30 UNIAX 5.5/6.0 - [id number] Implanted      Spinal Hardware SCREW, PEDICLE 6.0 X 35 UNIAX 5.5/6.0 - [id number] Implanted      Spinal Hardware SCREW, PEDICLE 6.0 X 40 UNIAX 5.5/6.0 - [id number] Implanted      Spinal Hardware SET SCREW, LARGE - [id number] Implanted      Screw TAMEKA, 6.0 X 500 COCR, SINGLE HEX - [id number] Implanted               Findings: Flexible spine    Indications: Mariana Jensen is an 20 y.o. female who is having surgery for Adolescent idiopathic scoliosis of thoracolumbar region [M41.125].  Her progressive curve reached the point of requiring treatment.    The patient was seen in the preoperative area. The risks, benefits, complications, treatment options, non-operative alternatives, expected recovery and outcomes were discussed with the patient. The possibilities of reaction to medication, pulmonary aspiration, injury to surrounding structures, bleeding, recurrent infection, the need for additional procedures, failure to diagnose a condition, and creating a complication requiring transfusion or operation were discussed with the  patient. The patient concurred with the proposed plan, giving informed consent.  The site of surgery was properly noted/marked if necessary per policy. The patient has been actively warmed in preoperative area. Preoperative antibiotics have been ordered and given within 1 hours of incision. Venous thrombosis prophylaxis have been ordered including bilateral sequential compression devices    Procedure Details: The patient was identified in the preoperative holding area.  The operative site, the back, had been marked.  We proceeded to the   operating room on a gurney and underwent general endotracheal anesthesia.  Anesthesia placed 2 large IVs and an arterial line.  A Ely catheter was placed as well as neuro monitoring leads.  The patient was then turned to the prone position on the Mello frame and all bony prominences were well padded.  A time-out was performed.  Then, we proceeded with prepping and draping in the usual sterile fashion using alcohol and ChloraPrep.  Next, a midline incision was made down the back and dissection was carried down through soft tissues with Bovie electrocautery.  We identified the fascia and then split that as well as the apophysis with Bovie electrocautery and then performed a subperiosteal dissection from the tips of the spinous processes to the tips of the transverse processes from T5 to L3.  Duramorph was given intraoperatively in the lumbar intrathecal space by me.  There were no complications with this.  During this process, we used fluoroscopy to confirm our location and to ensure that we were doing the correct levels.  Next, the pedicle screws were placed by performing facetectomies in the lumbar spine using a rongeur and then decorticating over the pedicle.  We used Lenke probe followed by a ball-tip probe to check for the position of the screw and then placed the screw.  This was done from caudal to cranial starting with the lumbar spine and then working our way up through  the thoracic spine.  In the thoracic spine, the facetectomies were performed with a Capener gouge.  Screws were placed in the similar fashion.  Once all screws were in position, we confirmed again with motor-evoked potentials (done throughout the process), sensory evoked potentials, and EMG that all of the screws were in good position.  All screws were confirmed with fluoroscopy to be in a good position.  We cut and contoured rods to fit the spine and then reduced them into the set screws, starting on the left side.  Derotation maneuvers as well as compression and distraction were performed to give the most reasonable correction.  With that complete, all of the set screws were tightened both provisionally and finally, and then we thoroughly irrigated with sterile saline and bacitracin.  We added vancomycin powder, both allograft and autograft bone graft, and then closed the fascia with #1 Vicryl in single interrupted and running fashion.  A drain was placed, and then we closed the subcutaneous and subcuticular tissues with 2-0 Vicryl and 4-0 Monocryl.  Dressings were applied including Steri-Strips, Adaptic with bacitracin, and Mepilex AG.   Evidence of Infection: No   Complications:  None; patient tolerated the procedure well.    Disposition: PACU - hemodynamically stable.  Condition: stable                 Additional Details: She was admitted to the hospital postoperatively and her pain control as well as infection prevention management will proceed according to protocol.  Her follow-up visit will be in 1 month with new upright AP and lateral scoliosis x-rays.    Attending Attestation: I was present and scrubbed for the entire procedure.    Carmen Jones  Phone Number: [phone]

## 2025-05-23 NOTE — DISCHARGE INSTR - DIET
Resume regular diet.  Try to include high protein and high iron foods.    Drink plenty of fluids.

## 2025-05-28 DIAGNOSIS — G89.18 POSTOPERATIVE PAIN: ICD-10-CM

## 2025-05-28 RX ORDER — ACETAMINOPHEN 325 MG/1
650 TABLET ORAL EVERY 6 HOURS PRN
Qty: 30 TABLET | Refills: 1 | Status: SHIPPED | OUTPATIENT
Start: 2025-05-28 | End: 2025-07-27

## 2025-05-31 DIAGNOSIS — F41.9 ANXIETY: ICD-10-CM

## 2025-06-02 RX ORDER — SERTRALINE HYDROCHLORIDE 50 MG/1
75 TABLET, FILM COATED ORAL DAILY
Qty: 135 TABLET | Refills: 0 | Status: SHIPPED | OUTPATIENT
Start: 2025-06-02

## 2025-06-16 ENCOUNTER — OFFICE VISIT (OUTPATIENT)
Dept: ORTHOPEDIC SURGERY | Facility: CLINIC | Age: 20
End: 2025-06-16
Payer: COMMERCIAL

## 2025-06-16 ENCOUNTER — HOSPITAL ENCOUNTER (OUTPATIENT)
Dept: RADIOLOGY | Facility: CLINIC | Age: 20
Discharge: HOME | End: 2025-06-16
Payer: COMMERCIAL

## 2025-06-16 ENCOUNTER — PATIENT MESSAGE (OUTPATIENT)
Dept: ORTHOPEDIC SURGERY | Facility: CLINIC | Age: 20
End: 2025-06-16

## 2025-06-16 VITALS — BODY MASS INDEX: 24.03 KG/M2 | HEIGHT: 62 IN

## 2025-06-16 DIAGNOSIS — M41.9 SCOLIOSIS, UNSPECIFIED SCOLIOSIS TYPE, UNSPECIFIED SPINAL REGION: ICD-10-CM

## 2025-06-16 DIAGNOSIS — G89.18 POSTOPERATIVE PAIN: ICD-10-CM

## 2025-06-16 PROCEDURE — 72082 X-RAY EXAM ENTIRE SPI 2/3 VW: CPT | Performed by: RADIOLOGY

## 2025-06-16 PROCEDURE — 99024 POSTOP FOLLOW-UP VISIT: CPT | Performed by: ORTHOPAEDIC SURGERY

## 2025-06-16 PROCEDURE — 72082 X-RAY EXAM ENTIRE SPI 2/3 VW: CPT

## 2025-06-16 PROCEDURE — 99212 OFFICE O/P EST SF 10 MIN: CPT | Performed by: ORTHOPAEDIC SURGERY

## 2025-06-16 ASSESSMENT — PAIN - FUNCTIONAL ASSESSMENT: PAIN_FUNCTIONAL_ASSESSMENT: 0-10

## 2025-06-16 ASSESSMENT — PAIN SCALES - GENERAL: PAINLEVEL_OUTOF10: 4

## 2025-06-16 NOTE — PROGRESS NOTES
Subjective   Patient ID: Mariana Jensen is a 20 y.o. female who presents for Scoliosis and Post-op of the Spine.    History of Present Illness  The patient presents for evaluation status post surgery.    She has been managing her pain with over-the-counter Tylenol and ibuprofen, having discontinued the use of Oxycodone and Valium. She occasionally takes naproxen in the morning and evening, and typically resorts to Tylenol at the end of the day when the pain intensifies. She reports a slight delay in the healing process of the upper part of her incision, which is now closed and progressing well. Approximately 1 to 2 weeks ago, she experienced minor oozing from the incision. Her appetite has returned to normal. She reports no numbness or tingling in her legs or toes but mentions a sensation of numbness in her ribs.     She has been using a pregnancy pillow to aid her sleep, but she continues to experience discomfort at night, leading to frequent awakenings. She reports no issues with urination. She has been engaging in daily walks and has visited a golf course with her boyfriend, where she walked uphill. She is curious about the weight limit she should adhere to and whether she can begin minimal exercise. She also inquires about the possibility of starting to twist and bend slightly.     She has not sought physical therapy or massage for her pain. She expresses concern about her reflexes post-surgery, as she experiences increased pain when attempting to catch a falling object. She has tried using an ice pack but found it uncomfortable. She also reports feeling overheated easily and has experienced sweating even while sitting in the waiting room. She describes an unusual sensation when attempting to retract her shoulders. She has been advised that she can travel by car for extended periods, provided she takes breaks to walk around. She has also resumed driving.    SOCIAL HISTORY  Exercise: Walking daily, walking on  "hills at the golf course    Previous HPI:     ROS: A 16 system review is negative for all items except as in the HPI.     Objective     Ht 1.575 m (5' 2\")   BMI 24.03 kg/m²      Physical Exam  General: Patient appears well and in no distress.  Integumentary: Incision site at the top appears healed but had struggled initially.    Musculoskeletal:  Gait: Walking about a block, able to walk up hills.  Shoulders: Almost leveled, slight asymmetry expected post-surgery.  Lower back: Improved curvature, protective against future arthritis.  Legs: No numbness or tingling noted.    Results  Imaging   - X-ray: Good derotation and almost leveled shoulders post-surgery.       Assessment & Plan  1. Postoperative status:    The patient is currently 4 months postoperative and is demonstrating satisfactory progress. Numbness could be attributed to nerve compression during the procedure, with a typical recovery period for nerves ranging from 30 to 90 days. Discomfort experienced when retracting shoulders is common post-surgery. Intermittent muscle discomfort is anticipated for the next few months, which should be alleviated through stretching, heat application, and massage therapy. Daily stretching exercises are encouraged to increase flexibility over the next week. Massage therapy is recommended as a beneficial treatment option. Golfing can commence in October 2025.    Follow-up  Scheduled for a follow-up visit on 12/15/2025 at 1:15 PM with upright AP/Lat scoliosis Xrays.      Carmen Jones MD     This medical note was created with the assistance of artificial intelligence (AI) for documentation purposes. The content has been reviewed and confirmed by the healthcare provider for accuracy and completeness. Patient consented to the use of audio recording and use of AI during their visit.   "

## 2025-06-23 RX ORDER — DIAZEPAM 2 MG/1
2 TABLET ORAL NIGHTLY PRN
Qty: 30 TABLET | Refills: 0 | Status: SHIPPED | OUTPATIENT
Start: 2025-06-23

## 2025-07-07 ENCOUNTER — APPOINTMENT (OUTPATIENT)
Dept: RADIOLOGY | Facility: CLINIC | Age: 20
End: 2025-07-07
Payer: COMMERCIAL

## 2025-07-07 ENCOUNTER — APPOINTMENT (OUTPATIENT)
Dept: ORTHOPEDIC SURGERY | Facility: CLINIC | Age: 20
End: 2025-07-07
Payer: COMMERCIAL

## 2025-07-07 DIAGNOSIS — M41.9 SCOLIOSIS, UNSPECIFIED SCOLIOSIS TYPE, UNSPECIFIED SPINAL REGION: ICD-10-CM

## 2025-08-08 ENCOUNTER — APPOINTMENT (OUTPATIENT)
Dept: ORTHOPEDIC SURGERY | Facility: CLINIC | Age: 20
End: 2025-08-08
Payer: COMMERCIAL

## 2025-08-15 DIAGNOSIS — F41.9 ANXIETY: ICD-10-CM

## 2025-08-17 DIAGNOSIS — N94.6 DYSMENORRHEA: ICD-10-CM

## 2025-08-18 RX ORDER — SERTRALINE HYDROCHLORIDE 50 MG/1
75 TABLET, FILM COATED ORAL DAILY
Qty: 135 TABLET | Refills: 0 | Status: SHIPPED | OUTPATIENT
Start: 2025-08-18

## 2025-08-18 RX ORDER — TIMOLOL MALEATE 5 MG/ML
1 SOLUTION/ DROPS OPHTHALMIC
Qty: 84 TABLET | Refills: 2 | Status: SHIPPED | OUTPATIENT
Start: 2025-08-18

## 2025-08-20 ENCOUNTER — PATIENT MESSAGE (OUTPATIENT)
Dept: ORTHOPEDIC SURGERY | Facility: CLINIC | Age: 20
End: 2025-08-20
Payer: COMMERCIAL

## 2025-09-04 ENCOUNTER — TELEPHONE (OUTPATIENT)
Dept: PEDIATRICS | Facility: CLINIC | Age: 20
End: 2025-09-04

## 2025-09-04 ENCOUNTER — APPOINTMENT (OUTPATIENT)
Dept: PEDIATRICS | Facility: CLINIC | Age: 20
End: 2025-09-04
Payer: COMMERCIAL

## 2026-01-05 ENCOUNTER — APPOINTMENT (OUTPATIENT)
Dept: OBSTETRICS AND GYNECOLOGY | Facility: CLINIC | Age: 21
End: 2026-01-05
Payer: COMMERCIAL

## (undated) DEVICE — COLLECTION/DELIVERY SYSTEM, COPAN ESWAB, REG SIZE SWAB

## (undated) DEVICE — DRAPE, SHEET, THREE QUARTER, FAN FOLD, 57 X 77 IN

## (undated) DEVICE — KIT, PATIENT CARE, JACKSON TABLE W/PRONE-SAFE HEADREST

## (undated) DEVICE — SUTURE, SILK, 2-0, 18 IN, BLACK

## (undated) DEVICE — WAX, BONE, 2.5 GM

## (undated) DEVICE — WOUND SYSTEM, DEBRIDEMENT & CLEANING, O.R DUOPAK

## (undated) DEVICE — PROBE, PEDICLE SCREW, 190CM CABLE, DISPOSABLE

## (undated) DEVICE — BAG, DECANTER

## (undated) DEVICE — ELECTRODE, CORKSCREW NEEDLE 1.5M LENGTH

## (undated) DEVICE — ATS SUCTION LINE

## (undated) DEVICE — COVER, BACK TABLE, 65 X 90, HVY REINFORCED

## (undated) DEVICE — SUTURE, MONOCRYL, 4-0, 18 IN, PS2, UNDYED

## (undated) DEVICE — TAPE, SURGICAL, FOAM, MICROFOAM, HYPOALLERGENIC, 4 IN X 5.5 YD

## (undated) DEVICE — Device

## (undated) DEVICE — SYRINGE, HYPODERMIC, TB, W/O NEEDLE, 1 CC, SLIP TIP

## (undated) DEVICE — EVACUATOR, WOUND, CLOSED, 3 SPRING, 400 CC, Y CONNECTING TUBE

## (undated) DEVICE — CATHETER, URETHRAL, FOLEY, 2 WAY, BARDEX IC, 14 FR, 5 CC, SILICONE

## (undated) DEVICE — SOLUTION, IRRIGATION, SODIUM CHLORIDE 0.9%, 1000 ML, POUR BOTTLE

## (undated) DEVICE — NEEDLE, ELECTRODE, SUBDERMAL, PAIRED, 2.0 LEAD, DISP

## (undated) DEVICE — DRESSING, MEPILEX, BORDER FLEX, 4 X 4

## (undated) DEVICE — NEEDLE, FILTER 19 G X 1 IN

## (undated) DEVICE — GOWN, ASTOUND, L

## (undated) DEVICE — DRAPE, TOWEL, STERI DRAPE, 17 X 11 IN, PLASTIC, STERILE

## (undated) DEVICE — SOLUTION, INJECTION, USP, SODIUM CHLORIDE 0.9%, .9 NACL, 250 ML, BAG

## (undated) DEVICE — PITCHER, GRADUATE, 32 OZ (1200CC), STERILE

## (undated) DEVICE — ELECTRODE, GROUND PLATE

## (undated) DEVICE — KIT, PEDIATRIC SPINE, CUSTOM, UHC

## (undated) DEVICE — DRESSING, MEPILEX BORDER, POST-OP AG, 4 X 14 IN

## (undated) DEVICE — GLOVE, SURGICAL, PROTEXIS,  6.5, PF, LATEX

## (undated) DEVICE — SUTURE, VICRYL, 2-0, 27 IN, FSL, UNDYED

## (undated) DEVICE — COVER, CART, 45 X 27 X 48 IN, CLEAR

## (undated) DEVICE — DRAIN, WOUND, ROUND, W/TROCAR, HOLE PATTERN, 10 IN, MEDIUM/LARGE, 3/16 X 49 IN

## (undated) DEVICE — SUTURE, VICRYL, 1, 27 IN, CTX, VIOLET

## (undated) DEVICE — NEEDLE, SPINAL, 22 G X 3.5 IN, BLACK HUB

## (undated) DEVICE — CONNECTOR, 5 IN 1, BEEHIVE, 0.375 IN, STERILE, LF